# Patient Record
Sex: FEMALE | Race: WHITE | Employment: OTHER | URBAN - METROPOLITAN AREA
[De-identification: names, ages, dates, MRNs, and addresses within clinical notes are randomized per-mention and may not be internally consistent; named-entity substitution may affect disease eponyms.]

---

## 2021-08-26 ENCOUNTER — HOSPITAL ENCOUNTER (OUTPATIENT)
Dept: GENERAL RADIOLOGY | Age: 66
Discharge: HOME | End: 2021-08-26
Payer: MEDICARE

## 2021-08-26 DIAGNOSIS — M06.4 INFLAMMATORY POLYARTHRITIS (HCC): ICD-10-CM

## 2021-08-26 DIAGNOSIS — M15.9 GENERALIZED OSTEOARTHRITIS: ICD-10-CM

## 2021-08-26 DIAGNOSIS — R53.83 OTHER FATIGUE: ICD-10-CM

## 2021-08-26 DIAGNOSIS — E55.9 HYPOVITAMINOSIS D: ICD-10-CM

## 2021-08-26 DIAGNOSIS — Z79.899 LONG TERM CURRENT USE OF IMMUNOSUPPRESSIVE DRUG: ICD-10-CM

## 2021-08-26 DIAGNOSIS — Z11.1 TUBERCULOSIS SCREENING: ICD-10-CM

## 2021-08-26 DIAGNOSIS — Z79.1 LONG TERM (CURRENT) USE OF NON-STEROIDAL ANTI-INFLAMMATORIES (NSAID): ICD-10-CM

## 2021-08-26 DIAGNOSIS — Z79.52 LONG TERM (CURRENT) USE OF SYSTEMIC STEROIDS: ICD-10-CM

## 2021-08-26 DIAGNOSIS — M05.9 SEROPOSITIVE RHEUMATOID ARTHRITIS (HCC): ICD-10-CM

## 2021-08-26 DIAGNOSIS — R89.4 SEROLOGIC ABNORMALITY: ICD-10-CM

## 2021-08-26 DIAGNOSIS — R79.89 LFT ELEVATION: ICD-10-CM

## 2021-08-26 DIAGNOSIS — Z79.899 HIGH RISK MEDICATION USE: ICD-10-CM

## 2021-08-26 PROCEDURE — 73130 X-RAY EXAM OF HAND: CPT

## 2022-04-21 PROBLEM — E03.9 PRIMARY HYPOTHYROIDISM: Status: ACTIVE | Noted: 2022-04-21

## 2022-04-21 PROBLEM — K52.9 CHRONIC DIARRHEA: Status: ACTIVE | Noted: 2022-04-21

## 2022-05-23 ENCOUNTER — INFUSION (OUTPATIENT)
Dept: RHEUMATOLOGY | Age: 67
End: 2022-05-23
Payer: COMMERCIAL

## 2022-05-23 VITALS
HEART RATE: 84 BPM | TEMPERATURE: 97.3 F | SYSTOLIC BLOOD PRESSURE: 109 MMHG | WEIGHT: 139.8 LBS | BODY MASS INDEX: 27.3 KG/M2 | DIASTOLIC BLOOD PRESSURE: 67 MMHG

## 2022-05-23 DIAGNOSIS — M05.9 SEROPOSITIVE RHEUMATOID ARTHRITIS (HCC): Primary | ICD-10-CM

## 2022-05-23 PROCEDURE — 96413 CHEMO IV INFUSION 1 HR: CPT | Performed by: INTERNAL MEDICINE

## 2022-05-23 PROCEDURE — 96415 CHEMO IV INFUSION ADDL HR: CPT | Performed by: INTERNAL MEDICINE

## 2022-05-23 PROCEDURE — 96375 TX/PRO/DX INJ NEW DRUG ADDON: CPT | Performed by: INTERNAL MEDICINE

## 2022-05-23 RX ORDER — METHYLPREDNISOLONE SODIUM SUCCINATE 125 MG/2ML
100 INJECTION, POWDER, LYOPHILIZED, FOR SOLUTION INTRAMUSCULAR; INTRAVENOUS ONCE
Status: COMPLETED | OUTPATIENT
Start: 2022-05-23 | End: 2022-05-23

## 2022-05-23 RX ORDER — RITUXIMAB 10 MG/ML
375 INJECTION, SOLUTION INTRAVENOUS ONCE
Qty: 61.5 ML | Refills: 0 | Status: CANCELLED
Start: 2022-05-23 | End: 2022-05-23

## 2022-05-23 RX ADMIN — METHYLPREDNISOLONE SODIUM SUCCINATE 100 MG: 125 INJECTION, POWDER, LYOPHILIZED, FOR SOLUTION INTRAMUSCULAR; INTRAVENOUS at 09:35

## 2022-05-23 NOTE — PROGRESS NOTES
[id number] [address], [address]  Office : [phone], Fax: [phone]       Rituxan infusion today. Course # 2, treatment # 1. Patient's diagnosis is Seropositive Rheumatoid Arthritis. Rituxan 1000 mg infused in 250 ml NaCl. Patient monitored closely during infusion. Patient approved for free medication through [facility]. Premedicated with Tylenol 1000 mg PO, Antihistamine PO and Soul Medrol 100 mg given by IV push 30 minutes prior to the start of the Rituxan infusion. IV insertion time: 0930  Solu Medrol push given at 0935 over 2 minutes  Medication start time: 1005  Medication completion time: 1335    Pre-Infusion Questionnaire  1. Has your insurance changed or have you received a new card since your last visit? No  2. Have you had an infection since your last infusion? No  3. Have you recently had GI problems, open wounds, urinary problems, or chest pain? No  4. Are you currently taking antibiotics? 5. No  6. Have you recently had or are you scheduled for any surgical procedures? No  7. Have you had a TB test in the last year? Yes  8. Did you take an antihistamine today? Yes  9. Have you received any vaccinations recently? No  10. When was your last appointment with one of our providers? 4/27/22  11. When was your last infusion? 11/4/21  12. Are you in a skilled nursing facility?       No    Reviewed and Confirmed by Anthony Dickerson

## 2022-06-06 ENCOUNTER — INFUSION (OUTPATIENT)
Dept: RHEUMATOLOGY | Age: 67
End: 2022-06-06
Payer: COMMERCIAL

## 2022-06-06 VITALS — HEART RATE: 90 BPM | DIASTOLIC BLOOD PRESSURE: 74 MMHG | SYSTOLIC BLOOD PRESSURE: 134 MMHG | TEMPERATURE: 97.3 F

## 2022-06-06 DIAGNOSIS — M05.9 SEROPOSITIVE RHEUMATOID ARTHRITIS (HCC): Primary | ICD-10-CM

## 2022-06-06 PROCEDURE — 96375 TX/PRO/DX INJ NEW DRUG ADDON: CPT | Performed by: INTERNAL MEDICINE

## 2022-06-06 PROCEDURE — 96413 CHEMO IV INFUSION 1 HR: CPT | Performed by: INTERNAL MEDICINE

## 2022-06-06 RX ORDER — METHYLPREDNISOLONE SODIUM SUCCINATE 125 MG/2ML
100 INJECTION, POWDER, LYOPHILIZED, FOR SOLUTION INTRAMUSCULAR; INTRAVENOUS ONCE
Status: COMPLETED | OUTPATIENT
Start: 2022-06-06 | End: 2022-06-06

## 2022-06-06 RX ADMIN — METHYLPREDNISOLONE SODIUM SUCCINATE 100 MG: 125 INJECTION, POWDER, LYOPHILIZED, FOR SOLUTION INTRAMUSCULAR; INTRAVENOUS at 09:55

## 2022-06-06 NOTE — PROGRESS NOTES
[id number] [address], [address]  Office : [phone], Fax: [phone]       Rituxan infusion today. Course # 2, treatment # 2. Patient's diagnosis is rheumatoid arthritis . Rituxan 1000 mg infused in 250  ml NaCl. Patient monitored closely during infusion. Premedicated with Tylenol 1000 mg PO, Antihistamine PO and Soul Medrol 100  mg given by IV push 30 minutes prior to the start of the Rituxan infusion. Pt supplied medication. Lot # [id number] exp  12/2024    IV insertion time: 3627  Solu Medrol push given at 0955 over 2 minutes  Medication start time: 1025  Medication completion time: 1345    Pre-Infusion Questionnaire  1. Has your insurance changed or have you received a new card since your last visit? No  2. Have you had an infection since your last infusion?   no  3. Have you recently had GI problems, open wounds, urinary problems, or chest pain? No  4. Are you currently taking antibiotics? 5. No  6. Have you recently had or are you scheduled for any surgical procedures? No  7. Have you had a TB test in the last year? Yes  8. Did you take an antihistamine today? Yes  9. Have you received any vaccinations recently? Yes  10. When was your last appointment with one of our providers? 2/22/2022  11. When was your last infusion? 5/23/2022  12. Are you in a skilled nursing facility?       No    Reviewed and Confirmed by Aline Cottrell

## 2022-06-15 DIAGNOSIS — E03.9 PRIMARY HYPOTHYROIDISM: Primary | ICD-10-CM

## 2022-07-12 ENCOUNTER — TELEPHONE (OUTPATIENT)
Dept: RHEUMATOLOGY | Age: 67
End: 2022-07-12

## 2022-07-22 ENCOUNTER — OFFICE VISIT (OUTPATIENT)
Dept: ENDOCRINOLOGY | Age: 67
End: 2022-07-22
Payer: COMMERCIAL

## 2022-07-22 VITALS
SYSTOLIC BLOOD PRESSURE: 102 MMHG | DIASTOLIC BLOOD PRESSURE: 66 MMHG | OXYGEN SATURATION: 95 % | BODY MASS INDEX: 27.68 KG/M2 | HEIGHT: 60 IN | WEIGHT: 141 LBS | HEART RATE: 102 BPM

## 2022-07-22 DIAGNOSIS — E03.9 PRIMARY HYPOTHYROIDISM: Primary | ICD-10-CM

## 2022-07-22 PROCEDURE — 99213 OFFICE O/P EST LOW 20 MIN: CPT | Performed by: INTERNAL MEDICINE

## 2022-07-22 PROCEDURE — 1123F ACP DISCUSS/DSCN MKR DOCD: CPT | Performed by: INTERNAL MEDICINE

## 2022-07-22 RX ORDER — LEVOTHYROXINE SODIUM 88 UG/1
88 TABLET ORAL DAILY
Qty: 90 TABLET | Refills: 3 | Status: SHIPPED | OUTPATIENT
Start: 2022-07-22 | End: 2022-10-28 | Stop reason: SDUPTHER

## 2022-07-22 ASSESSMENT — ENCOUNTER SYMPTOMS
ROS SKIN COMMENTS: SHE REPORTS HAIR LOSS, DRY SKIN.
CONSTIPATION: 0
DIARRHEA: 1

## 2022-07-22 NOTE — PROGRESS NOTES
not want to treat her with other biologics. She has a history of fatty liver and her methotrexate was stopped. Skin:         She reports hair loss, dry skin. Neurological:  Negative for tremors. Vital Signs:  /66   Pulse (!) 102   Ht 5' (1.524 m)   Wt 141 lb (64 kg)   SpO2 95%   BMI 27.54 kg/m²     Wt Readings from Last 3 Encounters:   07/22/22 141 lb (64 kg)   05/23/22 139 lb 12.8 oz (63.4 kg)   04/27/22 138 lb (62.6 kg)       Physical Exam  Constitutional:       General: She is not in acute distress. Neck:      Thyroid: No thyroid mass or thyromegaly. Cardiovascular:      Rate and Rhythm: Normal rate and regular rhythm. Lymphadenopathy:      Cervical: No cervical adenopathy. Neurological:      Motor: No tremor. Orders Placed This Encounter   Procedures    TSH with Reflex     Standing Status:   Future     Standing Expiration Date:   7/22/2023         Current Outpatient Medications   Medication Sig Dispense Refill    ALPHA LIPOIC ACID PO Take 1,000 mg by mouth      Cyanocobalamin (VITAMIN B-12 PO) Take by mouth      levothyroxine (SYNTHROID) 88 MCG tablet Take 1 tablet by mouth in the morning.  90 tablet 3    amLODIPine-benazepril (LOTREL) 5-10 MG per capsule Take 1 capsule by mouth daily      Cholecalciferol 50 MCG (2000 UT) TABS Take by mouth daily      estradiol (ESTRACE) 0.5 MG tablet Take 0.5 mg by mouth every 48 hours      omeprazole (PRILOSEC) 40 MG delayed release capsule Take 40 mg by mouth every 48 hours      predniSONE (DELTASONE) 5 MG tablet 5mg daily as needed      sertraline (ZOLOFT) 50 MG tablet Take 50 mg by mouth daily       Current Facility-Administered Medications   Medication Dose Route Frequency Provider Last Rate Last Admin    riTUXimab (RITUXAN) chemo injection 1,000 mg  1,000 mg IntraVENous Once Clarence Conley MD

## 2022-08-25 ENCOUNTER — OFFICE VISIT (OUTPATIENT)
Dept: RHEUMATOLOGY | Age: 67
End: 2022-08-25
Payer: COMMERCIAL

## 2022-08-25 VITALS
BODY MASS INDEX: 27.15 KG/M2 | HEART RATE: 86 BPM | WEIGHT: 139 LBS | SYSTOLIC BLOOD PRESSURE: 142 MMHG | DIASTOLIC BLOOD PRESSURE: 86 MMHG

## 2022-08-25 DIAGNOSIS — Z79.899 HIGH RISK MEDICATION USE: ICD-10-CM

## 2022-08-25 DIAGNOSIS — Z71.85 VACCINE COUNSELING: ICD-10-CM

## 2022-08-25 DIAGNOSIS — Z79.52 LONG TERM (CURRENT) USE OF SYSTEMIC STEROIDS: ICD-10-CM

## 2022-08-25 DIAGNOSIS — M05.9 SEROPOSITIVE RHEUMATOID ARTHRITIS (HCC): Primary | ICD-10-CM

## 2022-08-25 DIAGNOSIS — R53.83 OTHER FATIGUE: ICD-10-CM

## 2022-08-25 DIAGNOSIS — M15.9 GENERALIZED OSTEOARTHRITIS: ICD-10-CM

## 2022-08-25 DIAGNOSIS — M25.511 ACUTE PAIN OF RIGHT SHOULDER: ICD-10-CM

## 2022-08-25 DIAGNOSIS — Z79.899 LONG TERM CURRENT USE OF IMMUNOSUPPRESSIVE DRUG: ICD-10-CM

## 2022-08-25 DIAGNOSIS — E55.9 HYPOVITAMINOSIS D: ICD-10-CM

## 2022-08-25 PROCEDURE — 1123F ACP DISCUSS/DSCN MKR DOCD: CPT | Performed by: INTERNAL MEDICINE

## 2022-08-25 PROCEDURE — 20610 DRAIN/INJ JOINT/BURSA W/O US: CPT | Performed by: INTERNAL MEDICINE

## 2022-08-25 PROCEDURE — 99215 OFFICE O/P EST HI 40 MIN: CPT | Performed by: INTERNAL MEDICINE

## 2022-08-25 RX ORDER — PREDNISONE 1 MG/1
TABLET ORAL
Qty: 90 TABLET | Refills: 0 | Status: SHIPPED | OUTPATIENT
Start: 2022-08-25

## 2022-08-25 RX ORDER — METHYLPREDNISOLONE ACETATE 80 MG/ML
80 INJECTION, SUSPENSION INTRA-ARTICULAR; INTRALESIONAL; INTRAMUSCULAR; SOFT TISSUE ONCE
Status: COMPLETED | OUTPATIENT
Start: 2022-08-25 | End: 2022-08-25

## 2022-08-25 RX ADMIN — METHYLPREDNISOLONE ACETATE 80 MG: 80 INJECTION, SUSPENSION INTRA-ARTICULAR; INTRALESIONAL; INTRAMUSCULAR; SOFT TISSUE at 13:25

## 2022-08-25 NOTE — PATIENT INSTRUCTIONS
1. Labs - cbc, cmp, vit d before next visit   2.   R shoulder Injected - try not to overuse for next couple of days  3. prednisone  5mg daily as needed   4. Vit d 2000 daily   5.   RTC in  3 months  with labs call if any issues   6 flu shot asap

## 2022-08-25 NOTE — PROGRESS NOTES
Subjective:      HPI:      Permanent history    Mrs. Zully Nur is a very pleasant 61-year-old  lady with past medical history significant for hypertension, hyperlipidemia, hypothyroidism, osteoporosis, osteoarthritis and rheumatoid arthritis who presents for evaluation. Was diagnosed approximately 20 years ago in [place], and initially her diagnosis was PMR as she started with hip problems and trouble walking. Was on prednisone for about a year and felt very well on it. Moved to [place] approximately 9 to 10 years ago. At that time his joints started getting worse and involving more the joint of the posterior muscles. Seen by outside rheumatologist, who did work-up and diagnosed RA. Was started on methotrexate and prednisone this was working well for a while. However patient felt she needed to wean off due to fatty liver, therefore slowly came off all her meds. Still having symptoms stiffness swelling, pain and redness in joints. However trying to take many over-the-counter herbal remedies if possible. At one time she may have restarted methotrexate, but discontinued shortly thereafter because of worsening liver issues. Since then has been maintained on long-term diclofenac. Occasionally takes this with ibuprofen also. Despite this symptoms significantly worsening in the past 18 months, now All her joint hurt. Pain is continuing to get worse. Dx with melanoma in neck, removed - doesn't go to onc - no treatments. Right neck symptoms are severe, she cant get down on her knees. Worse first in the morning with morning stiffness lasting up to 30 minutes. Generally improves with movement worse with prolonged inactivity. Family history significant for, 1 daughter- healthy. Mom with polychondritis,  young. Dad with cardio issues-passed away in 76s. 1 brother- had prostate cancer . Mom had thyroid issue. mamadou has thyroid issues.      Had covid 2021 - after vaccinated - mild symtooms. Some difficlty swalling pills. T Alcohol 3-4 drinks a day. fromer smoker- a few years total, quit over 20 years ago. Last bmd [id number] years ago- dx with osteopenia. otal hysterecotmy - everythig taken out - abbout 10 years ago. Since last visit  Pt is Vit D 2000 daily and Pred 5mg every day since 7/15/22 for pain. Worst pain Rt shoulder and hands. Pt has had swelling in hands and knees. Pt has Rituxin 5/23/22 and 6/6/22. Pt didn't feel any better after the Rituxin. She has tried tylenol arthritis and ibuprofen,helped some, felt better after starting Pred but took some time. Labs in Care. ROS:     Musculoskeletal ROS:   .    Abnormal:  joint pain,jont swelling,neck pain  . AM stiffness (hours): 10min  . Pain scale (0-10): 4  . Fatigue scale (0-10): 4  .    Job status: not working - retired  . Activities of daily living: some difficulty,    positive as above with the addition of   Otherwise negative for:  Fevers, chills or sweats. Weight  stable  No headaches or scalp tenderness. No jaw claudication. No acute visual changes. No red or dry eyes. No auditory complaints. No nasal discharge or rhinorrhea or dry mouth. No oral lesions or ulcers. No sore throat. No tongue pain. No cough. No shortness of breath, dyspnea on exertion or wheezing. No hemoptysis. No chest pains or palpitations. No lightheadedness. No pedal edema. No GERD symptoms, abdominal pain,diarrhea or constipation. No increased urinary frequency, nocturia, dysuria or changes in urine color. No alopecia, skin rashes/lesions. No changes in skin tightness. No Raynaud's. Photosensitivity. Current Outpatient Medications:     Alpha Lipoic Acid 600 mg cap, , Disp: , Rfl:     cyanocobalamin (Vitamin B-12) 1,000 mcg tablet, , Disp: , Rfl:     levothyroxine (SYNTHROID) 100 mcg tablet, Take 1 Tablet by mouth daily. , Disp: 90 Tablet, Rfl: 3    cholecalciferol, vitamin D3, (Vitamin D3) 50 mcg (2,000 unit) tab, Take  by mouth daily. , Disp: , Rfl:     amLODIPine-benazepril (LOTREL) 5-10 mg per capsule, Take 1 Capsule by mouth daily. , Disp: , Rfl:     estradioL (ESTRACE) 0.5 mg tablet, Take 0.5 mg by mouth every fourty-eight (48) hours. , Disp: , Rfl:     omeprazole (PRILOSEC) 40 mg capsule, Take 40 mg by mouth every fourty-eight (48) hours. , Disp: , Rfl:     predniSONE (DELTASONE) 5 mg tablet, prednisone 10 mg daily for 5 days then down to 7.5 mg daily x 5 then 5mg daily as needed, Disp: 100 Tablet, Rfl: 0    sertraline (ZOLOFT) 50 mg tablet, Take 50 mg by mouth daily. , Disp: , Rfl:     Allergies   Allergen Reactions    Fluoxetine Other (comments)     Welts on skin       Patient Active Problem List   Diagnosis Code    Primary hypothyroidism E03.9    Chronic diarrhea K52.9       Past Medical History:   Diagnosis Date    HTN (hypertension)     Hypothyroidism     Melanoma ([facility] [address]     OA (osteoarthritis)     OP (osteoporosis)     Rheumatoid arthritis ([facility] [address]        Past Surgical History:   Procedure Laterality Date    HX CARPAL TUNNEL RELEASE Right 2019    HX  SECTION      HX HYSTERECTOMY      HX OTHER SURGICAL  2022    greater toenail removal bilateral        Family History   Problem Relation Age of Onset    Other Mother         polychondritis     OSTEOARTHRITIS Mother     Thyroid Disease Mother     Heart Disease Father     Stroke Father     Other Father         kidney disease    Prostate Cancer Brother     Lung Cancer Paternal Grandfather     Thyroid Disease Daughter        Social History     Socioeconomic History    Marital status:    Tobacco Use    Smoking status: Former Smoker     Types: Cigarettes    Smokeless tobacco: Never Used   Substance and Sexual Activity    Alcohol use:  Yes     Alcohol/week: 3.0 standard drinks     Types: 3 Standard drinks or equivalent per week           Objective:      Vitals:    22 1058   BP: (!) 142/86   Pulse: 86   Weight: 139 lb (63 kg)         PHYSICAL EXAMINATION: General: alert, healthy, well nourished, pleasant, no acute distress   Lungs: clear to auscultation bilaterally without wheezes,    Heart: regular rate & rhythm, +S1, +S2, no murmurs   Extremities: no clubbing, cyanosis, or edema  Neuro: grossly non-focal,       MUSCULOSKELETAL:   -Hands: Fullness with tenderness to palpation throughout the MCPs and PIPs bilaterally R>L   -Wrists: Tenderness and fullness bilaterally  -Elbows: normal   -Shoulders: R shoudler TTP with decreased Rom on abduction   -Neck: Slow but full range of motion due to pain  -Back: Point tenderness of the low back  -Hips: normal   -Knees: Bilateral crepitus tenderness to palpation  -Ankles: Bilateral tenderness  -Feet: Fullness with tenderness to palpation bilaterally R>L     Swollen Joint Count:3 - more chronic thikcening   Tender Joint Count: 4         Procedure:   Patient identified, procedure verified, site verified. Risks/benefits discussed. Final verification of procedure. Timeout performed. Application of topical anesthetic and sterile preparation. injection site: right shoulderinjected with depomedrol 80 mg and 0.5ml of 1% lidocaine. Patient tolerated well, no complications. Assessment:       Mrs. Megan King is a very pleasant 64-year-old  lady with past medical history significant for hypertension, hyperlipidemia, hypothyroidism, osteoporosis, osteoarthritis, fatty liver disease, history of melanoma, and rheumatoid arthritis who presents for fu. Work-up shows longstanding rheumatoid arthritis previously treated with methotrexate but discontinued after fatty liver diagnosis,? History of Remicade patient denies however her old records show that may have used. Has been off all DMARDs except long-term NSAIDs for the past several years with worsening symptoms needs more aggressive regiment now.   labs normal CBC CMP shows mildly elevated LFTs, normal hepatitis panel, borderline TSH.   Positive CCP in the past -but now normal.  X-ray of the hands which I reviewed myself show periarticular osteopenia, mild joint space narrowing, mild erosive changes on the right MCPs consistent with rheumatoid arthritis. Clinical picture suggestive of seropositive rheumatoid arthritis, poorly controlled currently. Extensive comorbidities including history of melanoma, fatty liver disease therefore limited in our options. discussed options and changed to rituximab . First treatment completed nov 2021, most recent dose in June, was off of prednisone for several weeks restarted recently 5 mg a day as needed. Discussed next dose closer to when needing more prednisone or at least 4 months since last infusion. Right shoulder flaring more osteoarthritis, significant pain with abduction, was injected. Please note TSH is persistently fluctuant, thus is a contributing factor in her arthritis, r now follows with endocrine- reviewed records - TSH stable and ok to proceed with rituxan - labs stable. Osteoporosis screening, DEXA scan January 2022 shows FRAX calculation (using patients risk factors ) of 10 yr risk of major osteoporotic and hip fracture is 9.2% and 1% respectively and Tx warranted if >20% and 3 % respectively thus continue vitamin D supplementation. Treatment plan was discussed in detail with patient. Agreement and understanding of the plan was verbalized by the patient. Total visit time   45 minutes, greater than half of the time was spent on counseling. Plan:       1. Labs - cbc, cmp, vit d before next visit   2.   R shoulder Injected - try not to overuse for next couple of days  3. prednisone  5mg daily as needed   4. Vit d 2000 daily   5.   RTC in  3 months  with labs call if any issues   6 flu shot asap

## 2022-10-28 ENCOUNTER — OFFICE VISIT (OUTPATIENT)
Dept: ENDOCRINOLOGY | Age: 67
End: 2022-10-28
Payer: COMMERCIAL

## 2022-10-28 VITALS
WEIGHT: 140.6 LBS | OXYGEN SATURATION: 97 % | DIASTOLIC BLOOD PRESSURE: 88 MMHG | BODY MASS INDEX: 27.46 KG/M2 | HEART RATE: 100 BPM | SYSTOLIC BLOOD PRESSURE: 142 MMHG

## 2022-10-28 DIAGNOSIS — E03.9 PRIMARY HYPOTHYROIDISM: Primary | ICD-10-CM

## 2022-10-28 PROCEDURE — 1123F ACP DISCUSS/DSCN MKR DOCD: CPT | Performed by: INTERNAL MEDICINE

## 2022-10-28 PROCEDURE — 99213 OFFICE O/P EST LOW 20 MIN: CPT | Performed by: INTERNAL MEDICINE

## 2022-10-28 RX ORDER — LEVOTHYROXINE SODIUM 88 UG/1
88 TABLET ORAL DAILY
Qty: 90 TABLET | Refills: 3 | Status: SHIPPED | OUTPATIENT
Start: 2022-10-28

## 2022-10-28 ASSESSMENT — ENCOUNTER SYMPTOMS
ROS SKIN COMMENTS: SHE REPORTS HAIR LOSS, DRY SKIN.
DIARRHEA: 1
CONSTIPATION: 0

## 2022-10-28 NOTE — PROGRESS NOTES
Hipolito Pierce MD, [facility]  [address], [address] [facility], [address] [place] [address]  Phone [phone]  Facsimile [phone]          Che Manning is a 69 y.o. female seen 10/28/2022 for follow up of hypothyroidism        ASSESSMENT AND PLAN:    1. Primary hypothyroidism  She has a longstanding history of hypothyroidism. Based on her positive family history of thyroid disease and concurrent history of rheumatoid arthritis, I suspect she likely has Hashimoto's thyroiditis. She is clinically and biochemically euthyroid. She lives in an [address] [place] [address] Street and will be gone from 2/2023 until 8/2023. I will repeat her thyroid function tests in 1/2023 and prior to next visit. - levothyroxine (SYNTHROID) 88 MCG tablet; Take 1 tablet by mouth in the morning. Dispense: 90 tablet; Refill: 3      Follow-up and Dispositions    Return 8/2023. HISTORY OF PRESENT ILLNESS:    THYROID DISEASE     Presentation/Diagnosis: Hypothyroidism diagnosed in 1990 after the birth of her child. Symptoms: See review of systems. Treatment: Takes generic in AM correctly. Imaging: None. Labs:  3/13/2020: TSH 0.210.  9/16/2020: TSH 0.035, free T4 1.21.  2/25/2021: TSH 0.185, free T4 1.35.  1/7/2022: TSH 0.013, free T4 1.70.  2/21/2022: TSH 0.097, free T4 1.50.  4/20/2022: TSH 0.805, free T4 1.23, 25-OH vitamin D 45.9.  7/19/2022: TSH 0.239, free T4 1.55.  10/25/2022: TSH 0.643, free T4 1.49. Review of Systems   Constitutional:  Negative for diaphoresis, fatigue and unexpected weight change. Cardiovascular:  Negative for palpitations. Gastrointestinal:  Positive for diarrhea (significantly improved with probiotics). Negative for constipation. Endocrine: Negative for cold intolerance and heat intolerance. Musculoskeletal:         Her RA is only well controlled when she takes prednisone. She currently takes prednisone 5 mg daily.   She also receives Rituxan infusions every 6 months. She has a history of melanoma, so her rheumatologist does not want to treat her with other biologics. She has a history of fatty liver and her methotrexate was stopped. Skin:         She reports hair loss, dry skin. Neurological:  Negative for tremors. Vital Signs:  BP (!) 142/88   Pulse 100   Wt 140 lb 9.6 oz (63.8 kg)   SpO2 97%   BMI 27.46 kg/m²     Wt Readings from Last 3 Encounters:   10/28/22 140 lb 9.6 oz (63.8 kg)   08/25/22 139 lb (63 kg)   07/22/22 141 lb (64 kg)       Physical Exam  Constitutional:       General: She is not in acute distress. Neck:      Thyroid: No thyroid mass or thyromegaly. Cardiovascular:      Rate and Rhythm: Normal rate and regular rhythm. Lymphadenopathy:      Cervical: No cervical adenopathy. Neurological:      Motor: No tremor.          Orders Placed This Encounter   Procedures    TSH with Reflex     Standing Status:   Future     Standing Expiration Date:   10/28/2023    TSH with Reflex     Standing Status:   Future     Standing Expiration Date:   10/28/2023           Current Outpatient Medications   Medication Sig Dispense Refill    levothyroxine (SYNTHROID) 88 MCG tablet Take 1 tablet by mouth daily 90 tablet 3    predniSONE (DELTASONE) 5 MG tablet 5mg daily as needed 90 tablet 0    ALPHA LIPOIC ACID PO Take 1,000 mg by mouth      Cyanocobalamin (VITAMIN B-12 PO) Take by mouth      amLODIPine-benazepril (LOTREL) 5-10 MG per capsule Take 1 capsule by mouth daily      Cholecalciferol 50 MCG (2000 UT) TABS Take by mouth daily      estradiol (ESTRACE) 0.5 MG tablet Take 0.5 mg by mouth every 48 hours      omeprazole (PRILOSEC) 40 MG delayed release capsule Take 40 mg by mouth every 48 hours      sertraline (ZOLOFT) 50 MG tablet Take 50 mg by mouth daily       Current Facility-Administered Medications   Medication Dose Route Frequency Provider Last Rate Last Admin    riTUXimab (RITUXAN) chemo injection 1,000 mg  1,000 mg IntraVENous Once Kathy Paiz MD

## 2022-11-19 LAB
T4 FREE SERPL-MCNC: 1.37 NG/DL (ref 0.82–1.77)
TSH SERPL DL<=0.005 MIU/L-ACNC: 2.09 UIU/ML (ref 0.45–4.5)

## 2022-12-07 ENCOUNTER — PATIENT MESSAGE (OUTPATIENT)
Dept: ENDOCRINOLOGY | Age: 67
End: 2022-12-07

## 2022-12-07 DIAGNOSIS — E03.9 PRIMARY HYPOTHYROIDISM: Primary | ICD-10-CM

## 2022-12-07 NOTE — TELEPHONE ENCOUNTER
From: Margie Cook  To: Dr. Giordano Poor: 12/7/2022 8:31 AM EST  Subject: Test results     I made a mistake and took wrong lab order in, should have been rheumatology related test. Should I do another test at the end of January?

## 2022-12-10 LAB
25(OH)D3+25(OH)D2 SERPL-MCNC: 43.4 NG/ML (ref 30–100)
ALBUMIN SERPL-MCNC: 4.8 G/DL (ref 3.8–4.8)
ALBUMIN/GLOB SERPL: 2.2 {RATIO} (ref 1.2–2.2)
ALP SERPL-CCNC: 151 IU/L (ref 44–121)
ALT SERPL-CCNC: 42 IU/L (ref 0–32)
AST SERPL-CCNC: 48 IU/L (ref 0–40)
BASOPHILS # BLD AUTO: 0.1 X10E3/UL (ref 0–0.2)
BASOPHILS NFR BLD AUTO: 1 %
BILIRUB SERPL-MCNC: 0.6 MG/DL (ref 0–1.2)
BUN SERPL-MCNC: 15 MG/DL (ref 8–27)
BUN/CREAT SERPL: 18 (ref 12–28)
CALCIUM SERPL-MCNC: 9 MG/DL (ref 8.7–10.3)
CHLORIDE SERPL-SCNC: 107 MMOL/L (ref 96–106)
CO2 SERPL-SCNC: 21 MMOL/L (ref 20–29)
CREAT SERPL-MCNC: 0.84 MG/DL (ref 0.57–1)
EGFR: 75 ML/MIN/1.73
EOSINOPHIL # BLD AUTO: 0.1 X10E3/UL (ref 0–0.4)
EOSINOPHIL NFR BLD AUTO: 1 %
ERYTHROCYTE [DISTWIDTH] IN BLOOD BY AUTOMATED COUNT: 11.7 % (ref 11.7–15.4)
GLOBULIN SER CALC-MCNC: 2.2 G/DL (ref 1.5–4.5)
GLUCOSE SERPL-MCNC: 107 MG/DL (ref 70–99)
HCT VFR BLD AUTO: 41.5 % (ref 34–46.6)
HGB BLD-MCNC: 14.4 G/DL (ref 11.1–15.9)
IMM GRANULOCYTES # BLD AUTO: 0 X10E3/UL (ref 0–0.1)
IMM GRANULOCYTES NFR BLD AUTO: 0 %
LYMPHOCYTES # BLD AUTO: 2.4 X10E3/UL (ref 0.7–3.1)
LYMPHOCYTES NFR BLD AUTO: 25 %
MCH RBC QN AUTO: 33.7 PG (ref 26.6–33)
MCHC RBC AUTO-ENTMCNC: 34.7 G/DL (ref 31.5–35.7)
MCV RBC AUTO: 97 FL (ref 79–97)
MONOCYTES # BLD AUTO: 0.4 X10E3/UL (ref 0.1–0.9)
MONOCYTES NFR BLD AUTO: 4 %
NEUTROPHILS # BLD AUTO: 6.6 X10E3/UL (ref 1.4–7)
NEUTROPHILS NFR BLD AUTO: 69 %
PLATELET # BLD AUTO: 192 X10E3/UL (ref 150–450)
POTASSIUM SERPL-SCNC: 3.6 MMOL/L (ref 3.5–5.2)
PROT SERPL-MCNC: 7 G/DL (ref 6–8.5)
RBC # BLD AUTO: 4.27 X10E6/UL (ref 3.77–5.28)
SODIUM SERPL-SCNC: 145 MMOL/L (ref 134–144)
SPECIMEN STATUS REPORT: NORMAL
WBC # BLD AUTO: 9.7 X10E3/UL (ref 3.4–10.8)

## 2022-12-14 ENCOUNTER — TELEPHONE (OUTPATIENT)
Dept: RHEUMATOLOGY | Age: 67
End: 2022-12-14

## 2022-12-14 ENCOUNTER — TELEMEDICINE (OUTPATIENT)
Dept: RHEUMATOLOGY | Age: 67
End: 2022-12-14
Payer: MEDICARE

## 2022-12-14 DIAGNOSIS — M15.9 GENERALIZED OSTEOARTHRITIS: ICD-10-CM

## 2022-12-14 DIAGNOSIS — M25.511 ACUTE PAIN OF RIGHT SHOULDER: ICD-10-CM

## 2022-12-14 DIAGNOSIS — Z71.85 VACCINE COUNSELING: ICD-10-CM

## 2022-12-14 DIAGNOSIS — Z79.899 HIGH RISK MEDICATION USE: ICD-10-CM

## 2022-12-14 DIAGNOSIS — Z79.52 LONG TERM (CURRENT) USE OF SYSTEMIC STEROIDS: ICD-10-CM

## 2022-12-14 DIAGNOSIS — R53.83 OTHER FATIGUE: ICD-10-CM

## 2022-12-14 DIAGNOSIS — M05.9 SEROPOSITIVE RHEUMATOID ARTHRITIS (HCC): Primary | ICD-10-CM

## 2022-12-14 DIAGNOSIS — Z79.899 LONG TERM CURRENT USE OF IMMUNOSUPPRESSIVE DRUG: ICD-10-CM

## 2022-12-14 DIAGNOSIS — E55.9 HYPOVITAMINOSIS D: ICD-10-CM

## 2022-12-14 PROCEDURE — 1123F ACP DISCUSS/DSCN MKR DOCD: CPT | Performed by: INTERNAL MEDICINE

## 2022-12-14 PROCEDURE — 99215 OFFICE O/P EST HI 40 MIN: CPT | Performed by: INTERNAL MEDICINE

## 2022-12-14 RX ORDER — PREDNISONE 1 MG/1
TABLET ORAL
Qty: 180 TABLET | Refills: 0 | Status: SHIPPED | OUTPATIENT
Start: 2022-12-14

## 2022-12-14 RX ORDER — CLOBETASOL PROPIONATE 0.46 MG/ML
SOLUTION TOPICAL
COMMUNITY
Start: 2022-12-12

## 2022-12-14 NOTE — PROGRESS NOTES
Andrade Wilson is a 69 y.o. female who was seen by synchronous (real-time) audio-video technology. Consent:  She and/or her healthcare decision maker is aware that this patient-initiated Telehealth encounter is a billable service, with coverage as determined by her insurance carrier. She is aware that she may receive a bill and has provided verbal consent to proceed: Yes    I was at home while conducting this encounter. Subjective:      HPI:      Permanent history    Mrs. Hugo Posadas is a very pleasant 68-year-old  lady with past medical history significant for hypertension, hyperlipidemia, hypothyroidism, osteoporosis, osteoarthritis and rheumatoid arthritis who presents for evaluation. Was diagnosed approximately 20 years ago in [place], and initially her diagnosis was PMR as she started with hip problems and trouble walking. Was on prednisone for about a year and felt very well on it. Moved to [place] approximately 9 to 10 years ago. At that time his joints started getting worse and involving more the joint of the posterior muscles. Seen by outside rheumatologist, who did work-up and diagnosed RA. Was started on methotrexate and prednisone this was working well for a while. However patient felt she needed to wean off due to fatty liver, therefore slowly came off all her meds. Still having symptoms stiffness swelling, pain and redness in joints. However trying to take many over-the-counter herbal remedies if possible. At one time she may have restarted methotrexate, but discontinued shortly thereafter because of worsening liver issues. Since then has been maintained on long-term diclofenac. Occasionally takes this with ibuprofen also. Despite this symptoms significantly worsening in the past 18 months, now All her joint hurt. Pain is continuing to get worse. Dx with melanoma in neck, removed - doesn't go to onc - no treatments.   Right neck symptoms are severe, she pain,diarrhea or constipation. No increased urinary frequency, nocturia, dysuria or changes in urine color. No alopecia, skin rashes/lesions. No changes in skin tightness. No Raynaud's. Photosensitivity. Current Outpatient Medications:     Alpha Lipoic Acid 600 mg cap, , Disp: , Rfl:     cyanocobalamin (Vitamin B-12) 1,000 mcg tablet, , Disp: , Rfl:     levothyroxine (SYNTHROID) 100 mcg tablet, Take 1 Tablet by mouth daily. , Disp: 90 Tablet, Rfl: 3    cholecalciferol, vitamin D3, (Vitamin D3) 50 mcg (2,000 unit) tab, Take  by mouth daily. , Disp: , Rfl:     amLODIPine-benazepril (LOTREL) 5-10 mg per capsule, Take 1 Capsule by mouth daily. , Disp: , Rfl:     estradioL (ESTRACE) 0.5 mg tablet, Take 0.5 mg by mouth every fourty-eight (48) hours. , Disp: , Rfl:     omeprazole (PRILOSEC) 40 mg capsule, Take 40 mg by mouth every fourty-eight (48) hours. , Disp: , Rfl:     predniSONE (DELTASONE) 5 mg tablet, prednisone 10 mg daily for 5 days then down to 7.5 mg daily x 5 then 5mg daily as needed, Disp: 100 Tablet, Rfl: 0    sertraline (ZOLOFT) 50 mg tablet, Take 50 mg by mouth daily. , Disp: , Rfl:     Allergies   Allergen Reactions    Fluoxetine Other (comments)     Welts on skin       Patient Active Problem List   Diagnosis Code    Primary hypothyroidism E03.9    Chronic diarrhea K52.9       Past Medical History:   Diagnosis Date    HTN (hypertension)     Hypothyroidism     Melanoma ([facility] [address]     OA (osteoarthritis)     OP (osteoporosis)     Rheumatoid arthritis ([facility] [address]        Past Surgical History:   Procedure Laterality Date    HX CARPAL TUNNEL RELEASE Right 2019    HX  SECTION      HX HYSTERECTOMY      HX OTHER SURGICAL  2022    greater toenail removal bilateral        Family History   Problem Relation Age of Onset    Other Mother         polychondritis     OSTEOARTHRITIS Mother     Thyroid Disease Mother     Heart Disease Father     Stroke Father     Other Father         kidney disease    Prostate Cancer Brother     Lung Cancer Paternal Grandfather     Thyroid Disease Daughter        Social History     Socioeconomic History    Marital status:    Tobacco Use    Smoking status: Former Smoker     Types: Cigarettes    Smokeless tobacco: Never Used   Substance and Sexual Activity    Alcohol use: Yes     Alcohol/week: 3.0 standard drinks     Types: 3 Standard drinks or equivalent per week       Objective:          PHYSICAL EXAMINATION:     There were no vitals taken for this visit. General: alert, cooperative, no distress, Appears well-developed and well-nourished   HENT: Normocephalic, atraumatic   Mental  status: mental status: alert, oriented to person, place, and time, normal mood, behavior, speech, dress, motor activity, and thought processes   Resp: resp: normal effort and no respiratory distress   Neuro: neuro: no gross deficits - No Facial Asymmetry (Cranial nerve 7 motor function) (limited exam due to video visit)    Skin: skin: no discoloration or lesions of concern on visible areas   MSK; Normal gait with no signs of ataxia, Normal range of motion of neck     Due to this being a TeleHealth evaluation, many elements of the physical examination are unable to be assessed. Assessment:       Mrs. Akanksha Mckinley is a very pleasant 49-year-old  lady with past medical history significant for hypertension, hyperlipidemia, hypothyroidism, osteoporosis, osteoarthritis, fatty liver disease, history of melanoma, and rheumatoid arthritis who presents for fu. Work-up shows longstanding rheumatoid arthritis previously treated with methotrexate but discontinued after fatty liver diagnosis,? History of Remicade patient denies however her old records show that may have used. Has been off all DMARDs except long-term NSAIDs for the past several years with worsening symptoms needs more aggressive regiment now.   labs normal CBC CMP shows mildly elevated LFTs, normal hepatitis panel, borderline TSH. Positive CCP in the past -but now normal.  X-ray of the hands which I reviewed myself show periarticular osteopenia, mild joint space narrowing, mild erosive changes on the right MCPs consistent with rheumatoid arthritis. Clinical picture suggestive of seropositive rheumatoid arthritis, poorly controlled currently. Extensive comorbidities including history of melanoma, fatty liver disease therefore limited in our options. discussed options and changed to rituximab . First treatment completed nov 2021, most recent dose in June, was off of prednisone for several weeks restarted recently 5 mg a day as needed -now up to 10 mg prednisone the past few weeks especially after COVID, has been 5 and half months since her last dose will schedule soon as possible. We will also update TB QuantiFERON    Right shoulder flaring more osteoarthritis, significant pain with abduction, was injected -last visit, improved. Osteoporosis screening, DEXA scan January 2022 shows FRAX calculation (using patients risk factors ) of 10 yr risk of major osteoporotic and hip fracture is 9.2% and 1% respectively and Tx warranted if >20% and 3 % respectively thus continue vitamin D supplementation. Treatment plan was discussed in detail with patient. Agreement and understanding of the plan was verbalized by the patient. Total visit time   42 minutes, greater than half of the time was spent on counseling. Plan:       1. Labs -TB QuantiFERON at the next visit for infusion  2. Rituximab 1000 mg day 1 repeat day 15, IV for RA diagnosis needs to schedule soon as possible given end of the year  3. Prednisone 5 to 10 mg daily as needed  4. Vit d 2000 daily   5. RTC in  3 months  with labs call if any issues with CBC CMP and vitamin D           CPT Codes 47486-68360 for Established Patients may apply to this Telehealth Visit  Total visit time  42 minutes , greater than half of the time was spent on counseling. We discussed the expected course, resolution and complications of the diagnosis(es) in detail. Medication risks, benefits, costs, interactions, and alternatives were discussed as indicated. I advised her to contact the office if her condition worsens, changes or fails to improve as anticipated. She expressed understanding with the diagnosis(es) and plan. Pursuant to the emergency declaration under the [address] [place] [address] waiver authority and the [employer] General Act, this Virtual  Visit was conducted, with patient's consent, to reduce the patient's risk of exposure to COVID-19 and provide continuity of care for an established patient. Services were provided through a video synchronous discussion virtually to substitute for in-person clinic visit.     Leander Weinberg MD

## 2022-12-14 NOTE — TELEPHONE ENCOUNTER
----- Message from Margarito Hunter sent at 12/14/2022  2:07 PM EST -----  Regarding: Rituxan    Rituximab 1000 mg day 1 repeat day 15, IV for RA diagnosis needs to schedule soon as possible given end of the year    Per ZANDER [place] in place, schedule thurs or fri and draw out TB then an get next one in as well before end of year

## 2022-12-14 NOTE — PATIENT INSTRUCTIONS
1. Labs -TB QuantiFERON at the next visit for infusion  2. Rituximab 1000 mg day 1 repeat day 15, IV for RA diagnosis needs to schedule soon as possible given end of the year  3. Prednisone 5 to 10 mg daily as needed  4. Vit d 2000 daily   5.   RTC in  3 months  with labs call if any issues with CBC CMP and vitamin D

## 2022-12-14 NOTE — TELEPHONE ENCOUNTER
PHONE CALL to [facility] to request shipment of Rituxan. She does not have a current Rx on file for the patient. Spoke with Jeffrey [place], pharmacist and gave verbal Rx. Rituxan 1000 mg day 0, day 15, repeat every 4 months. PHONE CALL to patient to let her know I will call her once I have the delivery date for the Rituxan.

## 2022-12-16 NOTE — TELEPHONE ENCOUNTER
PHONE CALL to [facility] to order the Rituxan. Delivery set for 12/23/22. Order # [id number]. PHONE CALL to patient to schedule the infusion. LEFT MESSAGE on her VM.

## 2022-12-19 NOTE — TELEPHONE ENCOUNTER
Spoke with patient. Scheduled Rituxan for 12/21/22, second for 1/5/23. Will use stock and replace when hers arrives.  wanted the infusion to be done ASAP.

## 2022-12-21 ENCOUNTER — NURSE ONLY (OUTPATIENT)
Dept: RHEUMATOLOGY | Age: 67
End: 2022-12-21
Payer: MEDICARE

## 2022-12-21 VITALS
DIASTOLIC BLOOD PRESSURE: 92 MMHG | BODY MASS INDEX: 27.62 KG/M2 | WEIGHT: 141.4 LBS | TEMPERATURE: 97.3 F | HEART RATE: 70 BPM | SYSTOLIC BLOOD PRESSURE: 136 MMHG

## 2022-12-21 DIAGNOSIS — Z79.52 LONG TERM (CURRENT) USE OF SYSTEMIC STEROIDS: ICD-10-CM

## 2022-12-21 DIAGNOSIS — Z79.899 HIGH RISK MEDICATION USE: ICD-10-CM

## 2022-12-21 DIAGNOSIS — M15.9 GENERALIZED OSTEOARTHRITIS: ICD-10-CM

## 2022-12-21 DIAGNOSIS — R53.83 OTHER FATIGUE: ICD-10-CM

## 2022-12-21 DIAGNOSIS — Z79.899 LONG TERM CURRENT USE OF IMMUNOSUPPRESSIVE DRUG: ICD-10-CM

## 2022-12-21 DIAGNOSIS — M25.511 ACUTE PAIN OF RIGHT SHOULDER: ICD-10-CM

## 2022-12-21 DIAGNOSIS — M05.9 SEROPOSITIVE RHEUMATOID ARTHRITIS (HCC): ICD-10-CM

## 2022-12-21 DIAGNOSIS — Z71.85 VACCINE COUNSELING: ICD-10-CM

## 2022-12-21 DIAGNOSIS — E55.9 HYPOVITAMINOSIS D: ICD-10-CM

## 2022-12-21 PROCEDURE — 96413 CHEMO IV INFUSION 1 HR: CPT | Performed by: INTERNAL MEDICINE

## 2022-12-21 PROCEDURE — 96415 CHEMO IV INFUSION ADDL HR: CPT | Performed by: INTERNAL MEDICINE

## 2022-12-21 RX ORDER — METHYLPREDNISOLONE SODIUM SUCCINATE 125 MG/2ML
100 INJECTION, POWDER, LYOPHILIZED, FOR SOLUTION INTRAMUSCULAR; INTRAVENOUS ONCE
Status: COMPLETED | OUTPATIENT
Start: 2022-12-21 | End: 2022-12-21

## 2022-12-21 RX ADMIN — METHYLPREDNISOLONE SODIUM SUCCINATE 100 MG: 125 INJECTION, POWDER, LYOPHILIZED, FOR SOLUTION INTRAMUSCULAR; INTRAVENOUS at 09:45

## 2022-12-21 NOTE — PROGRESS NOTES
[address], [address], [address]   Office : [phone], Fax: [phone]       Rituxan infusion today. Course # 3, treatment # 1. Patient's diagnosis is Seropositive Rheumatoid Arthritis. Rituxan 1000 mg infused in 250 ml NaCl. Patient monitored closely during infusion. Premedicated with Tylenol 1000 mg PO, Antihistamine PO and Soul Medrol 100 mg given by IV push 30 minutes prior to the start of the Rituxan infusion. Patient medication supplied by [facility]. IV location: left forearm, Inserted by Lyric German RN  IV insertion time: 0485  Solu Medrol push given at 0945 over 2 minutes  Medication start time: 1015  Medication completion time: 1400    Patient discharged feeling fine and instructed to call the office with any post-infusion issues. Pre-Infusion Questionnaire  Has your insurance changed or have you received a new card since your last visit? No  Have you had an infection since your last infusion? No  Have you recently had GI problems, open wounds, urinary problems, or chest pain? No  Are you currently taking antibiotics? No  Have you recently had or are you scheduled for any surgical procedures? No  Have you had a TB test in the last year? Yes, 12/21/22 (pending)  Did you take an antihistamine today? Yes  Have you received any vaccinations recently? No  When was your last appointment with one of our providers? 12/14/22  When was your last infusion? 6/6/22  12. Are you in a skilled nursing facility?       No    Reviewed and Confirmed by Carlene Cueto

## 2023-01-05 ENCOUNTER — NURSE ONLY (OUTPATIENT)
Dept: RHEUMATOLOGY | Age: 68
End: 2023-01-05

## 2023-01-05 VITALS — TEMPERATURE: 97 F | DIASTOLIC BLOOD PRESSURE: 77 MMHG | HEART RATE: 70 BPM | SYSTOLIC BLOOD PRESSURE: 131 MMHG

## 2023-01-05 DIAGNOSIS — M05.9 SEROPOSITIVE RHEUMATOID ARTHRITIS (HCC): Primary | ICD-10-CM

## 2023-01-05 RX ORDER — METHYLPREDNISOLONE SODIUM SUCCINATE 125 MG/2ML
100 INJECTION, POWDER, LYOPHILIZED, FOR SOLUTION INTRAMUSCULAR; INTRAVENOUS ONCE
Status: COMPLETED | OUTPATIENT
Start: 2023-01-05 | End: 2023-01-05

## 2023-01-05 RX ADMIN — METHYLPREDNISOLONE SODIUM SUCCINATE 100 MG: 125 INJECTION, POWDER, LYOPHILIZED, FOR SOLUTION INTRAMUSCULAR; INTRAVENOUS at 09:40

## 2023-01-05 NOTE — PROGRESS NOTES
[address], [address], [address] [facility]  Office : [phone], Fax: [phone]       Rituxan infusion today. Course # 3, treatment # 2. Patient's diagnosis is rheumatoid arthritis. Rituxan 1000 mg infused in 250  ml NaCl. Patient monitored closely during infusion. Premedicated with Tylenol 1000 mg PO, Antihistamine PO and Soul Medrol 100  mg given by IV push 30 minutes prior to the start of the Rituxan infusion. IV location: right cephalic vein , Inserted by Kelly Ac RN  IV insertion time: 1857  Solu Medrol push given at  over 2 minutes  Medication start time: 1010  Medication completion time: 1330    Patient discharged feeling good and instructed to call the office with any post-infusion issues. Pre-Infusion Questionnaire  Has your insurance changed or have you received a new card since your last visit? No  Have you had an infection since your last infusion? No  Have you recently had GI problems, open wounds, urinary problems, or chest pain? No  Are you currently taking antibiotics? No  Have you recently had or are you scheduled for any surgical procedures? No  Have you had a TB test in the last year? Yes  Did you take an antihistamine today? Yes  Have you received any vaccinations recently? Yes  When was your last appointment with one of our providers? 12/14/2022  When was your last infusion? 12/21/2022  12. Are you in a skilled nursing facility?       No    Reviewed and Confirmed by Saint Sinner

## 2023-01-15 DIAGNOSIS — Z79.899 HIGH RISK MEDICATION USE: ICD-10-CM

## 2023-01-15 DIAGNOSIS — E55.9 HYPOVITAMINOSIS D: ICD-10-CM

## 2023-01-15 DIAGNOSIS — M25.511 ACUTE PAIN OF RIGHT SHOULDER: ICD-10-CM

## 2023-01-15 DIAGNOSIS — M15.9 GENERALIZED OSTEOARTHRITIS: ICD-10-CM

## 2023-01-15 DIAGNOSIS — Z79.52 LONG TERM (CURRENT) USE OF SYSTEMIC STEROIDS: ICD-10-CM

## 2023-01-15 DIAGNOSIS — R53.83 OTHER FATIGUE: ICD-10-CM

## 2023-01-15 DIAGNOSIS — Z71.85 VACCINE COUNSELING: ICD-10-CM

## 2023-01-15 DIAGNOSIS — Z79.899 LONG TERM CURRENT USE OF IMMUNOSUPPRESSIVE DRUG: ICD-10-CM

## 2023-01-15 DIAGNOSIS — M05.9 SEROPOSITIVE RHEUMATOID ARTHRITIS (HCC): ICD-10-CM

## 2023-01-15 RX ORDER — PREDNISONE 1 MG/1
TABLET ORAL
Qty: 180 TABLET | Refills: 0 | Status: SHIPPED | OUTPATIENT
Start: 2023-01-15

## 2023-01-18 ENCOUNTER — TELEPHONE (OUTPATIENT)
Dept: RHEUMATOLOGY | Age: 68
End: 2023-01-18

## 2023-01-18 NOTE — TELEPHONE ENCOUNTER
Pt lvm x2 with fax number for [facility] to send imaging orders. Corrected fax per pt is [phone]. Xmit orders via same as patient requested.

## 2023-01-18 NOTE — TELEPHONE ENCOUNTER
Rtc to [facility] radiology, [place]  [phone]. Orders received but she needs to do MRI clearance (does pt have metal, implants, etc). Requested she call and speak to patient to confirm this information.

## 2023-01-23 ENCOUNTER — APPOINTMENT (OUTPATIENT)
Dept: GENERAL RADIOLOGY | Age: 68
End: 2023-01-23
Payer: MEDICARE

## 2023-01-23 ENCOUNTER — HOSPITAL ENCOUNTER (EMERGENCY)
Age: 68
Discharge: HOME | End: 2023-01-23
Attending: EMERGENCY MEDICINE
Payer: MEDICARE

## 2023-01-23 ENCOUNTER — NURSE ONLY (OUTPATIENT)
Dept: RHEUMATOLOGY | Age: 68
End: 2023-01-23

## 2023-01-23 VITALS
OXYGEN SATURATION: 98 % | HEART RATE: 89 BPM | DIASTOLIC BLOOD PRESSURE: 77 MMHG | WEIGHT: 142 LBS | BODY MASS INDEX: 27.88 KG/M2 | RESPIRATION RATE: 16 BRPM | TEMPERATURE: 98.2 F | SYSTOLIC BLOOD PRESSURE: 116 MMHG | HEIGHT: 60 IN

## 2023-01-23 VITALS
WEIGHT: 146 LBS | HEIGHT: 60 IN | DIASTOLIC BLOOD PRESSURE: 76 MMHG | SYSTOLIC BLOOD PRESSURE: 128 MMHG | BODY MASS INDEX: 28.66 KG/M2 | HEART RATE: 84 BPM | RESPIRATION RATE: 14 BRPM

## 2023-01-23 DIAGNOSIS — M25.511 CHRONIC RIGHT SHOULDER PAIN: ICD-10-CM

## 2023-01-23 DIAGNOSIS — M05.9 SEROPOSITIVE RHEUMATOID ARTHRITIS (HCC): Primary | ICD-10-CM

## 2023-01-23 DIAGNOSIS — G89.29 CHRONIC RIGHT SHOULDER PAIN: ICD-10-CM

## 2023-01-23 DIAGNOSIS — M25.511 RIGHT SHOULDER PAIN, UNSPECIFIED CHRONICITY: Primary | ICD-10-CM

## 2023-01-23 PROCEDURE — 99283 EMERGENCY DEPT VISIT LOW MDM: CPT

## 2023-01-23 PROCEDURE — 73030 X-RAY EXAM OF SHOULDER: CPT

## 2023-01-23 ASSESSMENT — ROUTINE ASSESSMENT OF PATIENT INDEX DATA (RAPID3)
ON A SCALE OF ONE TO TEN, HOW DIFFICULT WAS IT FOR YOU TO COMPLETE THE LISTED DAILY PHYSICAL TASKS OVER THE LAST WEEK: 1.3
ON A SCALE OF ONE TO TEN, HOW MUCH OF A PROBLEM HAS UNUSUAL FATIGUE OR TIREDNESS BEEN FOR YOU OVER THE PAST WEEK?: 6
ON A SCALE OF ONE TO TEN, HOW MUCH PAIN HAVE YOU HAD BECAUSE OF YOUR CONDITION OVER THE PAST WEEK?: 9
WHEN YOU AWAKENED IN THE MORNING OVER THE LAST WEEK, PLEASE INDICATE THE AMOUNT OF TIME IT TAKES UNTIL YOU ARE AS LIMBER AS YOU WILL BE FOR THE DAY: > 1 HOUR
ON A SCALE OF ONE TO TEN, CONSIDERING ALL THE WAYS IN WHICH ILLNESS AND HEALTH CONDITIONS MAY AFFECT YOU AT THIS TIME, PLEASE INDICATE BELOW HOW YOU ARE DOING:: 9

## 2023-01-23 ASSESSMENT — PAIN SCALES - GENERAL: PAINLEVEL_OUTOF10: 9

## 2023-01-23 ASSESSMENT — PAIN DESCRIPTION - LOCATION: LOCATION: SHOULDER

## 2023-01-23 ASSESSMENT — PAIN DESCRIPTION - ORIENTATION: ORIENTATION: RIGHT

## 2023-01-23 ASSESSMENT — PAIN - FUNCTIONAL ASSESSMENT: PAIN_FUNCTIONAL_ASSESSMENT: 0-10

## 2023-01-23 ASSESSMENT — PAIN DESCRIPTION - DESCRIPTORS: DESCRIPTORS: SHARP;THROBBING

## 2023-01-23 NOTE — PROGRESS NOTES
1/23/23    `1/23/2023    SUBJECTIVE:  Alia Schwartz is a 69 y.o. female that is here for follow-up of:     Seropositive rheumatoid arthritis-last Rituxan completed end of December 2022, January 2023. Chronic R shoulder pain -received steroid injection in  Aug 2022 which provided relief for 6 weeks. Increasing symptoms since that time - constant pain 24 hours and very limited abduction. Pain increases with any activity or carrying any items in has minimal improvement with rest.  Has not completed physical therapy. Has not completed x-ray that was ordered by me last week. Has not scheduled MRI which was ordered by me last week. Leaving Feb 1 for [place] and wants to do PT there. No benefit with rtx infusion. Tried pred 20 mg x 7-10 days without benefit so stopped. Her daughter is a physical therapist and has investigated PT locations in [place] for her. She has this information for me today.       REVIEW OF SYSTEMS:  A total of 10 systems (musculoskeletal system as stated in the HPI and the following 9 systems) were reviewed with patient today and were negative except as stated in the HPI and except for the following (depicted with an \"X\"):        \"X\" Constitutional  \"X\" HEENT /Mouth  \"X\" Cardiovascular and  Respiratory (2 systems)  \"X\" Gastrointestinal    Fever/chills   Hair loss   Shortness of breath   Upset stomach    Falls   Dry mouth   Coughing   Diarrhea / constipation    Wt loss   Mouth sores   Wheezing   Heartburn    Wt gain   Ringing ears   Chest pain   Dark or bloody stools    Night sweats   Diff. swallowing  X None of above   Nausea or vomiting   X None of above  X None of above     X None of above                \"X\" Integumentary  \"X\" Neurological  \"X\" Genitourinary  \"X\" Psychiatric    Easy bruising   Numbness/ tingling   Female problems   Depression    Rashes   Weakness   Problems with urination   Feeling anxious    Sun sensitivity   Headaches  X None of above   Problems sleeping   X None of above  X None of above     X None of above       The following portions of the patient's history were reviewed and updated as appropriate:     Current Outpatient Medications:     clobetasol (TEMOVATE) 0.05 % external solution, , Disp: , Rfl:     levothyroxine (SYNTHROID) 88 MCG tablet, Take 1 tablet by mouth daily, Disp: 90 tablet, Rfl: 3    ALPHA LIPOIC ACID PO, Take 1,000 mg by mouth, Disp: , Rfl:     Cyanocobalamin (VITAMIN B-12 PO), Take by mouth, Disp: , Rfl:     amLODIPine-benazepril (LOTREL) 5-10 MG per capsule, Take 1 capsule by mouth daily, Disp: , Rfl:     Cholecalciferol 50 MCG (2000 UT) TABS, Take by mouth daily, Disp: , Rfl:     estradiol (ESTRACE) 0.5 MG tablet, Take 0.5 mg by mouth every 48 hours, Disp: , Rfl:     omeprazole (PRILOSEC) 40 MG delayed release capsule, Take 40 mg by mouth every 48 hours, Disp: , Rfl:     sertraline (ZOLOFT) 50 MG tablet, Take 50 mg by mouth daily, Disp: , Rfl:     predniSONE (DELTASONE) 5 MG tablet, 5mg- 10mg  daily as needed (Patient not taking: Reported on 1/23/2023), Disp: 180 tablet, Rfl: 0    zinc 50 MG CAPS, Take by mouth daily (Patient not taking: Reported on 1/23/2023), Disp: , Rfl:         Allergies   Allergen Reactions    Fluoxetine Other (See Comments)     Welts on skin           PHYSICAL EXAM:  /76   Pulse 84   Resp 14   Ht 5' (1.524 m)   Wt 146 lb (66.2 kg)   BMI 28.51 kg/m²   CONSTITUTIONAL:  The patient was in NAD.  ENT:  The OPC, MMM, lips/teeth/gums without abnormalities. NECK:  No masses or thyromegaly  LYMPH NODES:  No cervical or axillary lymphadenopathy. CV:  RRR no r/m/g. Normal peripheral pulses  RESP:  CTA bilaterally. Normal respiratory effort. GI:  Abdomen soft, non tender, no hepatosplenomegaly  Skin:  No rashes, nodules, or other lesions.     MUSCULOSKELETAL :  All joints including neck, back bilateral shoulders, elbows, wrists, MCP's, PIP's, DIP's, hips, knees, ankles, toes are within normal limits including normal gross examination, no synovitis, no tenderness, n'l ROM EXCEPT:   Right shoulder: Reduced passive range of motion abduction, active abduction to 30 degrees    ASSESSMENT AND PLAN:  Guanakito Varela is a 69 y.o. female that is here for evaluation of rheumatoid arthritis. her problems include:    Idalia Rodriguez was seen today for injections. Diagnoses and all orders for this visit:    Seropositive rheumatoid arthritis ([address]  Due for Rituxan in July 2023. Patient might be traveling during this time and is hoping to complete the infusion in August 2023. Chronic right shoulder pain  After informed consent was obtained and after a time out was taken. under sterile technique (utilizing sterile gloves) the right shoulder was injected with 40 mg of depo-medrol and 1cc of 1% lidocaine. A 25-gauge 1.5 inch needle was advanced using a posterolateral approach. 0 cc of clear fluid was aspirated and medication was injected through the same needle. Ethyl chloride spray was used for local anesthetic. ER warnings given & post-care instructions given. The procedure was well tolerated. -Patient to complete x-ray right shoulder today  - Patient to schedule MRI shoulder  - PT order placed today    Follow-up with Dr. Jayson Barnes as planned      Patricia Luna MD  [address]  [address] [place] [address], [address]  [phone]

## 2023-01-23 NOTE — ED PROVIDER NOTES
Emergency Department Provider Note                   PCP:                Bishop Rachele MD, MD               Age: 69 y.o. Sex: female       ICD-10-CM    1. Right shoulder pain, unspecified chronicity  [address]          DISPOSITION Decision To Discharge 01/23/2023 10:16:33 AM       Medical Decision Making  Amount and/or Complexity of Data Reviewed  Radiology: ordered. Complexity of Problem: 1 undiagnosed new problem with uncertain prognosis. (4)      I have reviewed records from an external source: ED records from outside this hospital.  Considerations: Shared decision making was utilized in the care of this patient. Patient was discharged risks and benefits of hospitalization were discussed. Orders Placed This Encounter   Procedures    XR SHOULDER RIGHT (MIN 2 VIEWS)    Faisal Arellanoy  [employer] Arm Sling, Right; M        Medications - No data to display    New Prescriptions    No medications on file        Nasrin Santiago is a 69 y.o. female who presents to the Emergency Department with chief complaint of    Chief Complaint   Patient presents with    Shoulder Pain      HPI  Well-appearing 78-year-old female presents to the emergency department with complaint of right shoulder pain constant x2 months. States that the pain is always there and is largely unchanged since onset. Pain is increased with abduction of the right shoulder, which is limited. States she is in the emergency department today due to no new or worsening symptoms, but states that she saw her rheumatologist in office today, had steroid injection to the right shoulder and states that the rheumatologist advised her to come to the ED to get a x-ray of the right shoulder upon leaving her rheumatology appointment this morning. This is confirmed reading rheumatologist note in EMR. Patient states that she has had prior injection to the shoulder, steroid course. Rheumatologist note shows that she was previously advised to have PT and MRI which has not yet been obtained. Patient denies chest pain, back pain, neck pain, difficulty breathing, cough, night sweats, weight loss, hemoptysis, fevers or chills, puncture wound, fall, known injury or other complaint. No activity change. History of RA, OA, osteoporosis, melanoma, hypertension, hypothyroidism. Is pleasant very well-appearing and appears no acute distress. Review of Systems  Constitutional: As in HPI  HENT: Negative     Eyes: Negative   Respiratory: Negative  Cardiovascular: Negative  GI/: As in HPI  Musculoskeletal: AS in HPI  Skin: Negative     Allergic/Immunologic: Negative  Neurological: Negative                        Past Medical History:   Diagnosis Date    HTN (hypertension)     Hypothyroidism     Melanoma ([address]     OA (osteoarthritis)     OP (osteoporosis)     Rheumatoid arthritis ([facility]         Past Surgical History:   Procedure Laterality Date    CARPAL TUNNEL RELEASE Right 2019     SECTION      HYSTERECTOMY (CERVIX STATUS UNKNOWN)      OTHER SURGICAL HISTORY  2022    greater toenail removal bilateral         Family History   Problem Relation Age of Onset    Prostate Cancer Brother     Lung Cancer Paternal Grandfather     Thyroid Disease Daughter     Other Father         kidney disease    Heart Disease Father     Thyroid Disease Mother     Osteoarthritis Mother     Other Mother         polychondritis     Stroke Father         Social History     Socioeconomic History    Marital status:    Tobacco Use    Smoking status: Former    Smokeless tobacco: Never   Substance and Sexual Activity    Alcohol use:  Yes     Alcohol/week: 3.0 standard drinks    Drug use: Not Currently        Allergies: Fluoxetine    Previous Medications    ALPHA LIPOIC ACID PO    Take 1,000 mg by mouth    AMLODIPINE-BENAZEPRIL (LOTREL) 5-10 MG PER CAPSULE    Take 1 capsule by mouth daily    CHOLECALCIFEROL 50 MCG (2000 UT) TABS    Take by mouth daily    CLOBETASOL (TEMOVATE) 0.05 % EXTERNAL SOLUTION        CYANOCOBALAMIN (VITAMIN B-12 PO)    Take by mouth    ESTRADIOL (ESTRACE) 0.5 MG TABLET    Take 0.5 mg by mouth every 48 hours    LEVOTHYROXINE (SYNTHROID) 88 MCG TABLET    Take 1 tablet by mouth daily    OMEPRAZOLE (PRILOSEC) 40 MG DELAYED RELEASE CAPSULE    Take 40 mg by mouth every 48 hours    PREDNISONE (DELTASONE) 5 MG TABLET    5mg- 10mg  daily as needed    SERTRALINE (ZOLOFT) 50 MG TABLET    Take 50 mg by mouth daily    ZINC 50 MG CAPS    Take by mouth daily        Vitals signs and nursing note reviewed. Patient Vitals for the past 4 hrs:   Temp Pulse Resp BP SpO2   01/23/23 0943 98.2 °F (36.8 °C) 89 16 116/77 98 %          Physical Exam   Constitutional: Oriented to person, place, and time. Appears well-developed and well-nourished. No distress. HENT:    Head: Normocephalic and atraumatic   Right Ear: External ear normal.    Left Ear: External ear normal.     Nose: Nose normal.   Mouth/Throat: Mouth normal.    Eyes: Conjunctivae are normal.   Neck: Supple. No tracheal deviation. Cardiovascular: Normal rate, intact distal pulses. Brisk capillary refill intact, less than 2 seconds. Regular rhythm present. No pitting edema. Pulmonary/Chest: Lungs are clear & equal bilaterally.-Sounds, no pain with inspiration, respiration or palpation. No adventitious sounds. No respiratory distress. Abdominal: Soft. There is no tenderness. Musculoskeletal: No obvious deformity, erythema, edema. No point tenderness of the right shoulder. There is no wound, lesion, swelling, erythema, palsy. Abduction is limited. No tenderness or normal finding of the right upper extremity, soft, no swelling, no pitting edema, skin normal color temperature. Intact distal pulses, cap refill less than 2 seconds.   No pallor, no pain on proportion, no midline tenderness of the neck or back, no chest wall or scapular tenderness. Neurological: Alert and oriented to person, place, and time. No numbness/tingling. No loss of sensation. Positive PMS ×4. GCS= 15. Skin: Skin is warm and dry. Capillary refill takes less than 2 seconds. No abrasion, no lesion, no petechiae and no rash noted. Not diaphoretic. No cyanosis, erythema, or pallor. Psychiatric: Normal mood and affect. Behavior is normal.    Nursing note and vitals reviewed. Procedures  Is this patient to be included in the SEP-1 core measure due to severe sepsis or septic shock? No Exclusion criteria - the patient is NOT to be included for SEP-1 Core Measure due to: Infection is not suspected     Results for orders placed or performed during the hospital encounter of 01/23/23   XR SHOULDER RIGHT (MIN 2 VIEWS)    Narrative    EXAMINATION: XR SHOULDER RIGHT (MIN 2 VIEWS) 1/23/2023 9:55 AM    ACCESSION NUMBER: [id number]    COMPARISON: None available    INDICATION: Pain    TECHNIQUE: 3 views of the right shoulder were obtained. FINDINGS:   No acute fracture or dislocation. Mild to moderate glenohumeral and AC joint  arthrosis. Bony mineralization is preserved. The soft tissues are unremarkable. Impression    1. No acute osseous abnormality. 2.  Mild-to-moderate glenohumeral and AC joint arthrosis. XR SHOULDER RIGHT (MIN 2 VIEWS)   Final Result   1. No acute osseous abnormality. 2.  Mild-to-moderate glenohumeral and AC joint arthrosis. Voice dictation software was used during the making of this note. This software is not perfect and grammatical and other typographical errors may be present. This note has not been completely proofread for errors.      Gilberto Alvarado, LIEN - CNP  01/23/23 4330

## 2023-01-23 NOTE — ED NOTES
I have reviewed discharge instructions with the patient. The patient verbalized understanding. Patient left ED via Discharge Method: ambulatory to Home with wself. Opportunity for questions and clarification provided. Patient given 0 scripts. To continue your aftercare when you leave the hospital, you may receive an automated call from our care team to check in on how you are doing. This is a free service and part of our promise to provide the best care and service to meet your aftercare needs.  If you have questions, or wish to unsubscribe from this service please call [phone]. Thank you for Choosing our [facility] Emergency Department.         Nasra Alcantara RN  01/23/23 1357

## 2023-01-23 NOTE — ED TRIAGE NOTES
Pt c/o right shoulder pain that started September. Pt denies fall or injury. Pt states she has been seeing rheumatology for her shoulder. Pt states she had a steroid shot in her right shoulder today.

## 2023-01-25 RX ORDER — METHYLPREDNISOLONE ACETATE 40 MG/ML
40 INJECTION, SUSPENSION INTRA-ARTICULAR; INTRALESIONAL; INTRAMUSCULAR; SOFT TISSUE ONCE
Status: SHIPPED | OUTPATIENT
Start: 2023-01-23

## 2023-01-27 LAB — TSH SERPL DL<=0.005 MIU/L-ACNC: 1.14 UIU/ML (ref 0.45–4.5)

## 2023-01-30 ENCOUNTER — TELEPHONE (OUTPATIENT)
Dept: RHEUMATOLOGY | Age: 68
End: 2023-01-30

## 2023-01-30 NOTE — TELEPHONE ENCOUNTER
Received vm from [employer] patient access. Patient was scheduled for MRI tomorrow but they have canceled this appt due to request for P2P from insurance. Scan is being declined.      Contact number for P2P is [phone]

## 2023-02-01 ENCOUNTER — TELEPHONE (OUTPATIENT)
Dept: RHEUMATOLOGY | Age: 68
End: 2023-02-01

## 2023-02-01 NOTE — TELEPHONE ENCOUNTER
Spoke with pt insurance to set up peer to peer. Reviewed patient hx and notes with peer to peer  Lainey VILLA. Due to documentation in patients chart and h/o oral and injected steroid use w no relief the MRI is being approved without peer to peer.       Approved 2/1/23-7/31/23 and is not a guarantee of payment    [id number]    Lainey VILLA intake interviewer for approval of MRI

## 2023-04-04 ENCOUNTER — TELEPHONE (OUTPATIENT)
Dept: RHEUMATOLOGY | Age: 68
End: 2023-04-04

## 2023-04-04 NOTE — TELEPHONE ENCOUNTER
Dx RA, Rituxan 12/21 and 1/5    Per pt,  Just wanted to update you on my last few months. Had my last infusion on January 5th with mild improvement overall except for my right shoulder. Followed up with Dr Kathie Neely for another cortisone shot. Still no relief. Had X-rays and MRI done which showed rotator cuff tears. Scheduled for surgery with Dr Josh Garcia on May 8th. Started with physical therapy. Was out of town for 3 weeks and came down with Covid again on March 8th. Went back on Paxlovid. Tested negative several times following. Still not feeling well. Came down with fever again but no positive Covid test. PCP said secondary bacterial infection. Took omnicef  and started back on prednisone 20 mg. Finally feeling better! I understand that the Paxlovid can have interaction with several of my medications such as Amlodipine, lauren thyroxine and Rotuxin. Could this have to do with only mild relief after last infusion? Please respond if necessary otherwise will see you at my June 8th appointment  (marcin and all)!   Thanks,   Joann Patel

## 2023-06-03 LAB
25(OH)D3+25(OH)D2 SERPL-MCNC: 52.4 NG/ML (ref 30–100)
ALBUMIN SERPL-MCNC: 4.8 G/DL (ref 3.8–4.8)
ALBUMIN/GLOB SERPL: 2 {RATIO} (ref 1.2–2.2)
ALP SERPL-CCNC: 148 IU/L (ref 44–121)
ALT SERPL-CCNC: 30 IU/L (ref 0–32)
AST SERPL-CCNC: 38 IU/L (ref 0–40)
BASOPHILS # BLD AUTO: 0.1 X10E3/UL (ref 0–0.2)
BASOPHILS NFR BLD AUTO: 1 %
BILIRUB SERPL-MCNC: 0.8 MG/DL (ref 0–1.2)
BUN SERPL-MCNC: 19 MG/DL (ref 8–27)
BUN/CREAT SERPL: 22 (ref 12–28)
CALCIUM SERPL-MCNC: 9.6 MG/DL (ref 8.7–10.3)
CHLORIDE SERPL-SCNC: 101 MMOL/L (ref 96–106)
CO2 SERPL-SCNC: 20 MMOL/L (ref 20–29)
CREAT SERPL-MCNC: 0.86 MG/DL (ref 0.57–1)
EGFRCR SERPLBLD CKD-EPI 2021: 73 ML/MIN/1.73
EOSINOPHIL # BLD AUTO: 0.1 X10E3/UL (ref 0–0.4)
EOSINOPHIL NFR BLD AUTO: 1 %
ERYTHROCYTE [DISTWIDTH] IN BLOOD BY AUTOMATED COUNT: 12.1 % (ref 11.7–15.4)
GLOBULIN SER CALC-MCNC: 2.4 G/DL (ref 1.5–4.5)
GLUCOSE SERPL-MCNC: 104 MG/DL (ref 70–99)
HCT VFR BLD AUTO: 39 % (ref 34–46.6)
HGB BLD-MCNC: 14 G/DL (ref 11.1–15.9)
IMM GRANULOCYTES # BLD AUTO: 0.1 X10E3/UL (ref 0–0.1)
IMM GRANULOCYTES NFR BLD AUTO: 1 %
LYMPHOCYTES # BLD AUTO: 2.8 X10E3/UL (ref 0.7–3.1)
LYMPHOCYTES NFR BLD AUTO: 30 %
MCH RBC QN AUTO: 33.6 PG (ref 26.6–33)
MCHC RBC AUTO-ENTMCNC: 35.9 G/DL (ref 31.5–35.7)
MCV RBC AUTO: 94 FL (ref 79–97)
MONOCYTES # BLD AUTO: 0.6 X10E3/UL (ref 0.1–0.9)
MONOCYTES NFR BLD AUTO: 7 %
NEUTROPHILS # BLD AUTO: 5.7 X10E3/UL (ref 1.4–7)
NEUTROPHILS NFR BLD AUTO: 60 %
PLATELET # BLD AUTO: 267 X10E3/UL (ref 150–450)
POTASSIUM SERPL-SCNC: 3.8 MMOL/L (ref 3.5–5.2)
PROT SERPL-MCNC: 7.2 G/DL (ref 6–8.5)
RBC # BLD AUTO: 4.17 X10E6/UL (ref 3.77–5.28)
SODIUM SERPL-SCNC: 140 MMOL/L (ref 134–144)
WBC # BLD AUTO: 9.4 X10E3/UL (ref 3.4–10.8)

## 2023-06-07 ENCOUNTER — TELEPHONE (OUTPATIENT)
Dept: RHEUMATOLOGY | Age: 68
End: 2023-06-07

## 2023-06-20 NOTE — TELEPHONE ENCOUNTER
Patient receives her Rituxan through [address] [place] [address] free of charge. PHONE CALL to [employer] to order her medication to be shipped to the office. Information could not be found. Will need to call Jenelle Rubio. PHONE CALL to Jenelle Rubio to check the status of her assistance. They have her as active and approved. Transferred me to [employer]. Did not . Will try again tomorrow.

## 2023-06-22 NOTE — TELEPHONE ENCOUNTER
PHONE CALL to [facility] to set up delivery of Rituxan. Patient [place]. [place] [address]. Patient would have to call to update her address with [facility]. I was not able to update. Will have pt call when in office. Active and refills are available. Rituxan to be delivered 6/29/23. PHONE CALL to patient to schedule her infusion. Scheduled for 6/27/23. Will use office stock and replace. Reminded pt of premeds- Tylenol and antihistamine.

## 2023-06-27 ENCOUNTER — NURSE ONLY (OUTPATIENT)
Dept: RHEUMATOLOGY | Age: 68
End: 2023-06-27
Payer: MEDICARE

## 2023-06-27 VITALS — SYSTOLIC BLOOD PRESSURE: 108 MMHG | DIASTOLIC BLOOD PRESSURE: 61 MMHG | HEART RATE: 84 BPM | TEMPERATURE: 98.1 F

## 2023-06-27 DIAGNOSIS — M05.9 SEROPOSITIVE RHEUMATOID ARTHRITIS (HCC): Primary | ICD-10-CM

## 2023-06-27 PROCEDURE — 96413 CHEMO IV INFUSION 1 HR: CPT | Performed by: INTERNAL MEDICINE

## 2023-06-27 PROCEDURE — 96415 CHEMO IV INFUSION ADDL HR: CPT | Performed by: INTERNAL MEDICINE

## 2023-06-27 PROCEDURE — 96375 TX/PRO/DX INJ NEW DRUG ADDON: CPT | Performed by: INTERNAL MEDICINE

## 2023-06-27 PROCEDURE — 96374 THER/PROPH/DIAG INJ IV PUSH: CPT | Performed by: INTERNAL MEDICINE

## 2023-06-27 RX ORDER — METHYLPREDNISOLONE SODIUM SUCCINATE 125 MG/2ML
100 INJECTION, POWDER, LYOPHILIZED, FOR SOLUTION INTRAMUSCULAR; INTRAVENOUS ONCE
Status: COMPLETED | OUTPATIENT
Start: 2023-06-27 | End: 2023-06-27

## 2023-06-27 RX ADMIN — METHYLPREDNISOLONE SODIUM SUCCINATE 100 MG: 125 INJECTION, POWDER, LYOPHILIZED, FOR SOLUTION INTRAMUSCULAR; INTRAVENOUS at 15:58

## 2023-07-12 ENCOUNTER — NURSE ONLY (OUTPATIENT)
Dept: RHEUMATOLOGY | Age: 68
End: 2023-07-12
Payer: MEDICARE

## 2023-07-12 VITALS — SYSTOLIC BLOOD PRESSURE: 138 MMHG | HEART RATE: 81 BPM | DIASTOLIC BLOOD PRESSURE: 84 MMHG | TEMPERATURE: 97.7 F

## 2023-07-12 DIAGNOSIS — M05.9 SEROPOSITIVE RHEUMATOID ARTHRITIS (HCC): Primary | ICD-10-CM

## 2023-07-12 PROCEDURE — 96415 CHEMO IV INFUSION ADDL HR: CPT | Performed by: INTERNAL MEDICINE

## 2023-07-12 PROCEDURE — 96375 TX/PRO/DX INJ NEW DRUG ADDON: CPT | Performed by: INTERNAL MEDICINE

## 2023-07-12 PROCEDURE — 96413 CHEMO IV INFUSION 1 HR: CPT | Performed by: INTERNAL MEDICINE

## 2023-07-12 PROCEDURE — 96374 THER/PROPH/DIAG INJ IV PUSH: CPT | Performed by: INTERNAL MEDICINE

## 2023-07-12 RX ORDER — METHYLPREDNISOLONE SODIUM SUCCINATE 125 MG/2ML
100 INJECTION, POWDER, LYOPHILIZED, FOR SOLUTION INTRAMUSCULAR; INTRAVENOUS ONCE
Status: COMPLETED | OUTPATIENT
Start: 2023-07-12 | End: 2023-07-12

## 2023-07-12 RX ADMIN — METHYLPREDNISOLONE SODIUM SUCCINATE 100 MG: 125 INJECTION, POWDER, LYOPHILIZED, FOR SOLUTION INTRAMUSCULAR; INTRAVENOUS at 11:20

## 2023-07-12 NOTE — PROGRESS NOTES
[place], [address] [place], [address]   Office : [phone], Fax: [phone]       Rituxan infusion today. Course # 4 , treatment # 2. Patient's diagnosis is Rheumatoid Arthriti . Rituxan 1 mg infused in 250  ml NaCl. Patient monitored closely during infusion. Premedicated with Tylenol 1000 mg PO, Antihistamine PO and Soul Medrol 100  mg given by IV push 30 minutes prior to the start of the Rituxan infusion. Pt supplied  by [facility]      IV location: left cephalic vein , Inserted by Sindhu Garcia RN  IV insertion time: 1120   Solu Medrol push given at 1120  over 2 minutes  Medication start time: 1145   Medication completion time: 1500     Patient discharged feeling good  and instructed to call the office with any post-infusion issues. Pre-Infusion Questionnaire  Has your insurance changed or have you received a new card since your last visit?  no  Have you had an infection since your last infusion? no  Since your last visit, have you been hospitalized, been to the ER, or Urgent [address] [place] [address] for any reason?  no  Have you recently had GI problems, open wounds, urinary problems, or chest pain? no  Are you currently taking antibiotics?   no  Have you recently had or are you scheduled for any surgical procedures?   no  Have you had a TB test in the last year?   yes,   Did you take an antihistamine today?  yes, Benadryl 25 mg PO  Have you received any vaccinations recently?  no  When was your last appointment with Dr. Jorge Shannon, Dr. Marilia Fofana, or Dr. Mancel Kocher or Dr. Becki Chen. VV 12/14/2022   When was your last infusion? 6/27/2023   12. Are you in a skilled nursing facility?       no    Reviewed and Confirmed by Ana Eaton

## 2023-08-08 LAB — TSH SERPL DL<=0.005 MIU/L-ACNC: 1.03 UIU/ML (ref 0.45–4.5)

## 2023-08-15 ENCOUNTER — OFFICE VISIT (OUTPATIENT)
Dept: ENDOCRINOLOGY | Age: 68
End: 2023-08-15
Payer: MEDICARE

## 2023-08-15 VITALS
WEIGHT: 143 LBS | OXYGEN SATURATION: 96 % | SYSTOLIC BLOOD PRESSURE: 116 MMHG | HEART RATE: 86 BPM | BODY MASS INDEX: 27.93 KG/M2 | DIASTOLIC BLOOD PRESSURE: 60 MMHG

## 2023-08-15 DIAGNOSIS — E03.9 PRIMARY HYPOTHYROIDISM: Primary | ICD-10-CM

## 2023-08-15 PROCEDURE — 1123F ACP DISCUSS/DSCN MKR DOCD: CPT | Performed by: INTERNAL MEDICINE

## 2023-08-15 PROCEDURE — 99213 OFFICE O/P EST LOW 20 MIN: CPT | Performed by: INTERNAL MEDICINE

## 2023-08-15 RX ORDER — LEVOTHYROXINE SODIUM 88 UG/1
88 TABLET ORAL DAILY
Qty: 90 TABLET | Refills: 3 | Status: SHIPPED | OUTPATIENT
Start: 2023-08-15

## 2023-08-15 ASSESSMENT — ENCOUNTER SYMPTOMS
ROS SKIN COMMENTS: SHE REPORTS HAIR LOSS, DRY SKIN.
CONSTIPATION: 0
DIARRHEA: 1

## 2023-08-26 LAB
25(OH)D3+25(OH)D2 SERPL-MCNC: 48.7 NG/ML (ref 30–100)
ALBUMIN SERPL-MCNC: 4.6 G/DL (ref 3.9–4.9)
ALBUMIN/GLOB SERPL: 2 {RATIO} (ref 1.2–2.2)
ALP SERPL-CCNC: 139 IU/L (ref 44–121)
ALT SERPL-CCNC: 25 IU/L (ref 0–32)
AST SERPL-CCNC: 32 IU/L (ref 0–40)
BASOPHILS # BLD AUTO: 0 X10E3/UL (ref 0–0.2)
BASOPHILS NFR BLD AUTO: 0 %
BILIRUB SERPL-MCNC: 0.9 MG/DL (ref 0–1.2)
BUN SERPL-MCNC: 14 MG/DL (ref 8–27)
BUN/CREAT SERPL: 16 (ref 12–28)
CALCIUM SERPL-MCNC: 9.4 MG/DL (ref 8.7–10.3)
CHLORIDE SERPL-SCNC: 103 MMOL/L (ref 96–106)
CO2 SERPL-SCNC: 21 MMOL/L (ref 20–29)
CREAT SERPL-MCNC: 0.86 MG/DL (ref 0.57–1)
EGFRCR SERPLBLD CKD-EPI 2021: 73 ML/MIN/1.73
EOSINOPHIL # BLD AUTO: 0.2 X10E3/UL (ref 0–0.4)
EOSINOPHIL NFR BLD AUTO: 2 %
ERYTHROCYTE [DISTWIDTH] IN BLOOD BY AUTOMATED COUNT: 12.2 % (ref 11.7–15.4)
GLOBULIN SER CALC-MCNC: 2.3 G/DL (ref 1.5–4.5)
GLUCOSE SERPL-MCNC: 108 MG/DL (ref 70–99)
HCT VFR BLD AUTO: 40.1 % (ref 34–46.6)
HGB BLD-MCNC: 13.8 G/DL (ref 11.1–15.9)
IMM GRANULOCYTES # BLD AUTO: 0.1 X10E3/UL (ref 0–0.1)
IMM GRANULOCYTES NFR BLD AUTO: 1 %
LYMPHOCYTES # BLD AUTO: 1.8 X10E3/UL (ref 0.7–3.1)
LYMPHOCYTES NFR BLD AUTO: 19 %
MCH RBC QN AUTO: 33.5 PG (ref 26.6–33)
MCHC RBC AUTO-ENTMCNC: 34.4 G/DL (ref 31.5–35.7)
MCV RBC AUTO: 97 FL (ref 79–97)
MONOCYTES # BLD AUTO: 0.6 X10E3/UL (ref 0.1–0.9)
MONOCYTES NFR BLD AUTO: 6 %
NEUTROPHILS # BLD AUTO: 7 X10E3/UL (ref 1.4–7)
NEUTROPHILS NFR BLD AUTO: 72 %
PLATELET # BLD AUTO: 203 X10E3/UL (ref 150–450)
POTASSIUM SERPL-SCNC: 4.3 MMOL/L (ref 3.5–5.2)
PROT SERPL-MCNC: 6.9 G/DL (ref 6–8.5)
RBC # BLD AUTO: 4.12 X10E6/UL (ref 3.77–5.28)
SODIUM SERPL-SCNC: 142 MMOL/L (ref 134–144)
WBC # BLD AUTO: 9.8 X10E3/UL (ref 3.4–10.8)

## 2023-08-31 ENCOUNTER — TELEMEDICINE (OUTPATIENT)
Dept: RHEUMATOLOGY | Age: 68
End: 2023-08-31
Payer: MEDICARE

## 2023-08-31 DIAGNOSIS — E55.9 HYPOVITAMINOSIS D: ICD-10-CM

## 2023-08-31 DIAGNOSIS — Z79.899 LONG TERM CURRENT USE OF IMMUNOSUPPRESSIVE DRUG: ICD-10-CM

## 2023-08-31 DIAGNOSIS — M54.16 RADICULOPATHY, LUMBAR REGION: ICD-10-CM

## 2023-08-31 DIAGNOSIS — Z71.85 VACCINE COUNSELING: ICD-10-CM

## 2023-08-31 DIAGNOSIS — Z79.52 LONG TERM (CURRENT) USE OF SYSTEMIC STEROIDS: ICD-10-CM

## 2023-08-31 DIAGNOSIS — G89.29 CHRONIC RIGHT SHOULDER PAIN: ICD-10-CM

## 2023-08-31 DIAGNOSIS — Z79.899 HIGH RISK MEDICATION USE: ICD-10-CM

## 2023-08-31 DIAGNOSIS — M25.511 CHRONIC RIGHT SHOULDER PAIN: ICD-10-CM

## 2023-08-31 DIAGNOSIS — R53.83 OTHER FATIGUE: ICD-10-CM

## 2023-08-31 DIAGNOSIS — M05.9 SEROPOSITIVE RHEUMATOID ARTHRITIS (HCC): Primary | ICD-10-CM

## 2023-08-31 DIAGNOSIS — M15.9 GENERALIZED OSTEOARTHRITIS: ICD-10-CM

## 2023-08-31 DIAGNOSIS — M25.511 ACUTE PAIN OF RIGHT SHOULDER: ICD-10-CM

## 2023-08-31 PROCEDURE — 1123F ACP DISCUSS/DSCN MKR DOCD: CPT | Performed by: INTERNAL MEDICINE

## 2023-08-31 PROCEDURE — 99215 OFFICE O/P EST HI 40 MIN: CPT | Performed by: INTERNAL MEDICINE

## 2023-08-31 RX ORDER — PREDNISONE 5 MG/1
TABLET ORAL
Qty: 90 TABLET | Refills: 1 | Status: SHIPPED | OUTPATIENT
Start: 2023-08-31

## 2023-08-31 RX ORDER — GABAPENTIN 300 MG/1
CAPSULE ORAL
Qty: 180 CAPSULE | Refills: 1 | Status: SHIPPED | OUTPATIENT
Start: 2023-08-31 | End: 2024-08-22

## 2023-08-31 NOTE — PROGRESS NOTES
Established Patients may apply to this Telehealth Visit  Total visit time 45 minutes , greater than half of the time was spent on counseling. We discussed the expected course, resolution and complications of the diagnosis(es) in detail. Medication risks, benefits, costs, interactions, and alternatives were discussed as indicated. I advised her to contact the office if her condition worsens, changes or fails to improve as anticipated. She expressed understanding with the diagnosis(es) and plan. Pursuant to the emergency declaration under the [place] [address] waiver authority and the [employer] Act, this Virtual  Visit was conducted, with patient's consent, to reduce the patient's risk of exposure to COVID-19 and provide continuity of care for an established patient. Services were provided through a video synchronous discussion virtually to substitute for in-person clinic visit.     Yamile Loera MD

## 2023-08-31 NOTE — PATIENT INSTRUCTIONS
1. Labs -CBC, CMP, vitamin D  2. Next rituximab dosing after December 12  3. Prednisone 5 once daily as needed  4. Start Tylenol scheduled at 1000 mg every morning and can take another dose 6 hours later as needed  5. Start gabapentin 300 mg once daily at 7 PM after we can increase to 600 mg daily at 7 PM  6. Flu shot due now  7. Vit d 2000 daily  8.   Return to clinic in 3-4 months with labs

## 2023-12-01 ENCOUNTER — TELEPHONE (OUTPATIENT)
Dept: RHEUMATOLOGY | Age: 68
End: 2023-12-01

## 2023-12-01 NOTE — TELEPHONE ENCOUNTER
Pt lvm that she wants to have labs for thyroid added on to labs she will be doing next Friday 12/8/23 for her next OV. She said this is a concern \"based on her interactions with her infusions\". Ok to add thyroid?

## 2023-12-04 DIAGNOSIS — R79.89 OTHER SPECIFIED ABNORMAL FINDINGS OF BLOOD CHEMISTRY: ICD-10-CM

## 2023-12-04 DIAGNOSIS — Z13.820 ENCOUNTER FOR SCREENING FOR OSTEOPOROSIS: ICD-10-CM

## 2023-12-04 DIAGNOSIS — E55.9 HYPOVITAMINOSIS D: ICD-10-CM

## 2023-12-04 DIAGNOSIS — M05.9 SEROPOSITIVE RHEUMATOID ARTHRITIS (HCC): ICD-10-CM

## 2023-12-04 DIAGNOSIS — R53.83 OTHER FATIGUE: Primary | ICD-10-CM

## 2023-12-09 LAB
25(OH)D3+25(OH)D2 SERPL-MCNC: 37.8 NG/ML (ref 30–100)
ALBUMIN SERPL-MCNC: 4.5 G/DL (ref 3.9–4.9)
ALBUMIN/GLOB SERPL: 2 {RATIO} (ref 1.2–2.2)
ALP SERPL-CCNC: 163 IU/L (ref 44–121)
ALT SERPL-CCNC: 38 IU/L (ref 0–32)
AST SERPL-CCNC: 46 IU/L (ref 0–40)
BASOPHILS # BLD AUTO: 0 X10E3/UL (ref 0–0.2)
BASOPHILS NFR BLD AUTO: 0 %
BILIRUB SERPL-MCNC: 0.7 MG/DL (ref 0–1.2)
BUN SERPL-MCNC: 14 MG/DL (ref 8–27)
BUN/CREAT SERPL: 15 (ref 12–28)
CALCIUM SERPL-MCNC: 8.9 MG/DL (ref 8.7–10.3)
CHLORIDE SERPL-SCNC: 106 MMOL/L (ref 96–106)
CO2 SERPL-SCNC: 18 MMOL/L (ref 20–29)
CREAT SERPL-MCNC: 0.93 MG/DL (ref 0.57–1)
EGFRCR SERPLBLD CKD-EPI 2021: 66 ML/MIN/1.73
EOSINOPHIL # BLD AUTO: 0.1 X10E3/UL (ref 0–0.4)
EOSINOPHIL NFR BLD AUTO: 1 %
ERYTHROCYTE [DISTWIDTH] IN BLOOD BY AUTOMATED COUNT: 12.3 % (ref 11.7–15.4)
GLOBULIN SER CALC-MCNC: 2.2 G/DL (ref 1.5–4.5)
GLUCOSE SERPL-MCNC: 81 MG/DL (ref 70–99)
HCT VFR BLD AUTO: 37.2 % (ref 34–46.6)
HGB BLD-MCNC: 13.2 G/DL (ref 11.1–15.9)
IMM GRANULOCYTES # BLD AUTO: 0 X10E3/UL (ref 0–0.1)
IMM GRANULOCYTES NFR BLD AUTO: 1 %
LYMPHOCYTES # BLD AUTO: 1.8 X10E3/UL (ref 0.7–3.1)
LYMPHOCYTES NFR BLD AUTO: 24 %
MCH RBC QN AUTO: 34.7 PG (ref 26.6–33)
MCHC RBC AUTO-ENTMCNC: 35.5 G/DL (ref 31.5–35.7)
MCV RBC AUTO: 98 FL (ref 79–97)
MONOCYTES # BLD AUTO: 0.5 X10E3/UL (ref 0.1–0.9)
MONOCYTES NFR BLD AUTO: 6 %
NEUTROPHILS # BLD AUTO: 5.2 X10E3/UL (ref 1.4–7)
NEUTROPHILS NFR BLD AUTO: 68 %
PLATELET # BLD AUTO: 159 X10E3/UL (ref 150–450)
POTASSIUM SERPL-SCNC: 3.8 MMOL/L (ref 3.5–5.2)
PROT SERPL-MCNC: 6.7 G/DL (ref 6–8.5)
RBC # BLD AUTO: 3.8 X10E6/UL (ref 3.77–5.28)
SODIUM SERPL-SCNC: 143 MMOL/L (ref 134–144)
SPECIMEN STATUS REPORT: NORMAL
WBC # BLD AUTO: 7.6 X10E3/UL (ref 3.4–10.8)

## 2023-12-13 ENCOUNTER — NURSE ONLY (OUTPATIENT)
Dept: RHEUMATOLOGY | Age: 68
End: 2023-12-13

## 2023-12-13 ENCOUNTER — OFFICE VISIT (OUTPATIENT)
Dept: RHEUMATOLOGY | Age: 68
End: 2023-12-13
Payer: MEDICARE

## 2023-12-13 VITALS
WEIGHT: 145 LBS | HEIGHT: 60 IN | SYSTOLIC BLOOD PRESSURE: 136 MMHG | HEART RATE: 81 BPM | DIASTOLIC BLOOD PRESSURE: 90 MMHG | BODY MASS INDEX: 28.47 KG/M2

## 2023-12-13 VITALS
DIASTOLIC BLOOD PRESSURE: 75 MMHG | HEART RATE: 81 BPM | WEIGHT: 145.5 LBS | SYSTOLIC BLOOD PRESSURE: 127 MMHG | TEMPERATURE: 97 F | BODY MASS INDEX: 28.42 KG/M2

## 2023-12-13 DIAGNOSIS — Z71.85 VACCINE COUNSELING: ICD-10-CM

## 2023-12-13 DIAGNOSIS — R53.83 OTHER FATIGUE: ICD-10-CM

## 2023-12-13 DIAGNOSIS — E55.9 HYPOVITAMINOSIS D: ICD-10-CM

## 2023-12-13 DIAGNOSIS — M05.9 SEROPOSITIVE RHEUMATOID ARTHRITIS (HCC): Primary | ICD-10-CM

## 2023-12-13 DIAGNOSIS — Z79.899 HIGH RISK MEDICATION USE: ICD-10-CM

## 2023-12-13 DIAGNOSIS — Z79.52 LONG TERM (CURRENT) USE OF SYSTEMIC STEROIDS: ICD-10-CM

## 2023-12-13 DIAGNOSIS — M54.16 RADICULOPATHY, LUMBAR REGION: ICD-10-CM

## 2023-12-13 DIAGNOSIS — M15.9 GENERALIZED OSTEOARTHRITIS: ICD-10-CM

## 2023-12-13 DIAGNOSIS — Z79.899 LONG TERM CURRENT USE OF IMMUNOSUPPRESSIVE DRUG: ICD-10-CM

## 2023-12-13 DIAGNOSIS — M25.511 ACUTE PAIN OF RIGHT SHOULDER: ICD-10-CM

## 2023-12-13 PROCEDURE — 1123F ACP DISCUSS/DSCN MKR DOCD: CPT | Performed by: INTERNAL MEDICINE

## 2023-12-13 PROCEDURE — 99215 OFFICE O/P EST HI 40 MIN: CPT | Performed by: INTERNAL MEDICINE

## 2023-12-13 RX ORDER — PREDNISONE 5 MG/1
TABLET ORAL
Qty: 90 TABLET | Refills: 1 | Status: CANCELLED | OUTPATIENT
Start: 2023-12-13

## 2023-12-13 RX ORDER — METHYLPREDNISOLONE SODIUM SUCCINATE 125 MG/2ML
100 INJECTION, POWDER, LYOPHILIZED, FOR SOLUTION INTRAMUSCULAR; INTRAVENOUS ONCE
Status: COMPLETED | OUTPATIENT
Start: 2023-12-13 | End: 2023-12-13

## 2023-12-13 RX ORDER — PREDNISONE 5 MG/1
TABLET ORAL
Qty: 180 TABLET | Refills: 0 | Status: CANCELLED | OUTPATIENT
Start: 2023-12-13

## 2023-12-13 RX ORDER — GABAPENTIN 300 MG/1
CAPSULE ORAL
Qty: 180 CAPSULE | Refills: 1 | Status: CANCELLED | OUTPATIENT
Start: 2023-12-13 | End: 2024-12-04

## 2023-12-13 RX ADMIN — METHYLPREDNISOLONE SODIUM SUCCINATE 100 MG: 125 INJECTION, POWDER, LYOPHILIZED, FOR SOLUTION INTRAMUSCULAR; INTRAVENOUS at 10:15

## 2023-12-13 NOTE — PROGRESS NOTES
Subjective:      HPI:      Permanent history    Mrs. Pascual Cunningham is a very pleasant 68-year-old  lady with past medical history significant for hypertension, hyperlipidemia, hypothyroidism, osteoporosis, osteoarthritis and rheumatoid arthritis who presents for evaluation. Was diagnosed approximately 20 years ago in [place], and initially her diagnosis was PMR as she started with hip problems and trouble walking. Was on prednisone for about a year and felt very well on it. Moved to [place] approximately 9 to 10 years ago. At that time his joints started getting worse and involving more the joint of the posterior muscles. Seen by outside rheumatologist, who did work-up and diagnosed RA. Was started on methotrexate and prednisone this was working well for a while. However patient felt she needed to wean off due to fatty liver, therefore slowly came off all her meds. Still having symptoms stiffness swelling, pain and redness in joints. However trying to take many over-the-counter herbal remedies if possible. At one time she may have restarted methotrexate, but discontinued shortly thereafter because of worsening liver issues. Since then has been maintained on long-term diclofenac. Occasionally takes this with ibuprofen also. Despite this symptoms significantly worsening in the past 18 months, now All her joint hurt. Pain is continuing to get worse. Dx with melanoma in neck, removed - doesn't go to onc - no treatments. Right neck symptoms are severe, she cant get down on her knees. Worse first in the morning with morning stiffness lasting up to 30 minutes. Generally improves with movement worse with prolonged inactivity. Family history significant for, 1 daughter- healthy. Mom with polychondritis,  young. Dad with cardio issues-passed away in 76s. 1 brother- had prostate cancer . Mom had thyroid issue. mamadou has thyroid issues.      Had covid 2021 - after vaccinated - mild

## 2023-12-13 NOTE — PATIENT INSTRUCTIONS
1. Labs -CBC, CMP, vitamin D  2. Can proceed with today's dose of rituximab 1000 mg IV today repeat day 15  3. Prednisone 5 once daily as needed  4. Continue Tylenol scheduled at 1000 mg every morning and can take another dose 6 hours later as needed  5. Continue gabapentin 6 to 900 mg once daily at 7 PM as needed  6. Vit d 2000 daily  7.   Return to clinic in 3-4 months with labs

## 2024-01-03 ENCOUNTER — NURSE ONLY (OUTPATIENT)
Dept: RHEUMATOLOGY | Age: 69
End: 2024-01-03
Payer: MEDICARE

## 2024-01-03 VITALS — HEART RATE: 80 BPM | SYSTOLIC BLOOD PRESSURE: 154 MMHG | DIASTOLIC BLOOD PRESSURE: 90 MMHG | TEMPERATURE: 97.9 F

## 2024-01-03 DIAGNOSIS — M05.9 SEROPOSITIVE RHEUMATOID ARTHRITIS (HCC): Primary | ICD-10-CM

## 2024-01-03 PROCEDURE — 96413 CHEMO IV INFUSION 1 HR: CPT | Performed by: INTERNAL MEDICINE

## 2024-01-03 PROCEDURE — 96415 CHEMO IV INFUSION ADDL HR: CPT | Performed by: INTERNAL MEDICINE

## 2024-01-03 PROCEDURE — 96374 THER/PROPH/DIAG INJ IV PUSH: CPT | Performed by: INTERNAL MEDICINE

## 2024-01-03 PROCEDURE — 96375 TX/PRO/DX INJ NEW DRUG ADDON: CPT | Performed by: INTERNAL MEDICINE

## 2024-01-03 RX ORDER — METHYLPREDNISOLONE SODIUM SUCCINATE 125 MG/2ML
100 INJECTION, POWDER, LYOPHILIZED, FOR SOLUTION INTRAMUSCULAR; INTRAVENOUS ONCE
Status: COMPLETED | OUTPATIENT
Start: 2024-01-03 | End: 2024-01-03

## 2024-01-03 RX ADMIN — METHYLPREDNISOLONE SODIUM SUCCINATE 100 MG: 125 INJECTION, POWDER, LYOPHILIZED, FOR SOLUTION INTRAMUSCULAR; INTRAVENOUS at 11:50

## 2024-01-03 NOTE — PROGRESS NOTES
[facility] RHEUMATOLOGY  [address] [place] [address], [address]  [place] [address]  Office : [phone], Fax: [phone]       Rituxan infusion today. Course # 5  , treatment #  2 . Patient's diagnosis is Rheumatoid Arthritis .   Rituxan 1000  mg infused in 250  ml NaCl. Patient monitored closely during infusion.   Premedicated with Tylenol 1000 mg PO, Antihistamine PO and Solu Medrol 100  mg given by IV push 30 minutes prior to the start of the Rituxan infusion.   Pt supplied by [facility]   IV location: left hand , Inserted by Shavonne Ruffin RN  IV insertion time: 1150   Solu Medrol push given at 1150  over 2 minutes  Medication start time: 1215   Medication completion time: 1535    Patient discharged feeling good  and instructed to call the office with any post-infusion issues.     Pre-Infusion Questionnaire  Has your insurance changed or have you received a new card since your last visit?  no  Have you had an infection since your last infusion?   no  Since your last visit, have you been hospitalized, been to the ER, or Urgent Care Center for any reason?  no  Have you recently had GI problems, open wounds, urinary problems, or chest pain?   no  Are you currently taking antibiotics?   no  Have you recently had or are you scheduled for any surgical procedures?   no  Have you had a TB test in the last year?   yes, 12/22/2023   Did you take an antihistamine today?  yes,   Have you received any vaccinations recently?  yes,   When was your last appointment with Dr. Boudreaux, Dr. Ly, or Dr. Castro or Dr. Zhang.   12/13/2023   When was your last infusion?        12/13/2023   12. Are you in a skilled nursing facility?      no    Reviewed and Confirmed by Che Moore

## 2024-02-28 NOTE — PROGRESS NOTES
[facility], [address] [place] [address], [address] [place] [address]  Office : [phone], Fax: [phone]       Rituxan infusion today. Course # 5, treatment # 1. Patient's diagnosis is Seropositive Rheumatoid Arthritis. Rituxan 1000 mg infused in 250 ml NaCl. Patient monitored closely during infusion. Premedicated with Tylenol 1000 mg PO, Antihistamine PO and Soul Medrol 100 mg given by IV push 30 minutes prior to the start of the Rituxan infusion. Patient medication supplied by [facility]     IV location: left forearm, Inserted by Aurora Harley RN  IV insertion time: 1010  Solu Medrol push given at 1015 over 2 minutes  Medication start time: 1030  Medication completion time: 1415    Patient discharged feeling fine and instructed to call the office with any post-infusion issues. Pre-Infusion Questionnaire  Has your insurance changed or have you received a new card since your last visit?  no  Have you had an infection since your last infusion? no  Since your last visit, have you been hospitalized, been to the ER, or Urgent [address] [place] [address] for any reason?  no  Have you recently had GI problems, open wounds, urinary problems, or chest pain? no  Are you currently taking antibiotics?   no  Have you recently had or are you scheduled for any surgical procedures?   no  Have you had a TB test in the last year?   yes, 12/21/22 (negative)  Did you take an antihistamine today? yes  Have you received any vaccinations recently?  no  When was your last appointment with Dr. Vianey Robin, Dr. Margaret Duong, or Dr. Wil James or Dr. Magalys Sullivan. 12/13/23  When was your last infusion? 7/12/23  12. Are you in a skilled nursing facility?       no    Reviewed and Confirmed by Michelle Stephens no

## 2024-03-11 RX ORDER — GABAPENTIN 300 MG/1
CAPSULE ORAL
Qty: 270 CAPSULE | Refills: 0 | Status: SHIPPED | OUTPATIENT
Start: 2024-03-11 | End: 2025-02-27

## 2024-05-02 ENCOUNTER — TELEMEDICINE (OUTPATIENT)
Dept: RHEUMATOLOGY | Age: 69
End: 2024-05-02
Payer: MEDICARE

## 2024-05-02 DIAGNOSIS — E55.9 HYPOVITAMINOSIS D: ICD-10-CM

## 2024-05-02 DIAGNOSIS — Z79.899 HIGH RISK MEDICATION USE: ICD-10-CM

## 2024-05-02 DIAGNOSIS — M25.511 CHRONIC RIGHT SHOULDER PAIN: ICD-10-CM

## 2024-05-02 DIAGNOSIS — Z79.899 LONG TERM CURRENT USE OF IMMUNOSUPPRESSIVE DRUG: ICD-10-CM

## 2024-05-02 DIAGNOSIS — G89.29 CHRONIC RIGHT SHOULDER PAIN: ICD-10-CM

## 2024-05-02 DIAGNOSIS — R53.83 OTHER FATIGUE: ICD-10-CM

## 2024-05-02 DIAGNOSIS — M05.9 SEROPOSITIVE RHEUMATOID ARTHRITIS (HCC): Primary | ICD-10-CM

## 2024-05-02 DIAGNOSIS — M15.9 GENERALIZED OSTEOARTHRITIS: ICD-10-CM

## 2024-05-02 DIAGNOSIS — Z79.52 LONG TERM (CURRENT) USE OF SYSTEMIC STEROIDS: ICD-10-CM

## 2024-05-02 DIAGNOSIS — R74.8 ABNORMAL LIVER ENZYMES: ICD-10-CM

## 2024-05-02 DIAGNOSIS — M54.16 RADICULOPATHY, LUMBAR REGION: ICD-10-CM

## 2024-05-02 PROCEDURE — 99215 OFFICE O/P EST HI 40 MIN: CPT | Performed by: INTERNAL MEDICINE

## 2024-05-02 PROCEDURE — 1123F ACP DISCUSS/DSCN MKR DOCD: CPT | Performed by: INTERNAL MEDICINE

## 2024-05-02 NOTE — PROGRESS NOTES
Che Moore is a 69 y.o. female who was seen by synchronous (real-time) audio-video technology.    Consent:  She and/or her healthcare decision maker is aware that this patient-initiated Telehealth encounter is a billable service, with coverage as determined by her insurance carrier. She is aware that she may receive a bill and has provided verbal consent to proceed: Yes    I was at home while conducting this encounter.           Subjective:      HPI:      Permanent history    Mrs. Moore is a very pleasant 68-year-old  lady with past medical history significant for hypertension, hyperlipidemia, hypothyroidism, osteoporosis, osteoarthritis and rheumatoid arthritis who presents for evaluation.    Was diagnosed approximately 20 years ago in [place], and initially her diagnosis was PMR as she started with hip problems and trouble walking.  Was on prednisone for about a year and felt very well on it.  Moved to [place] approximately 9 to 10 years ago.  At that time his joints started getting worse and involving more the joint of the posterior muscles.  Seen by outside rheumatologist, who did work-up and diagnosed RA.  Was started on methotrexate and prednisone this was working well for a while.  However patient felt she needed to wean off due to fatty liver, therefore slowly came off all her meds.  Still having symptoms stiffness swelling, pain and redness in joints.  However trying to take many over-the-counter herbal remedies if possible.  At one time she may have restarted methotrexate, but discontinued shortly thereafter because of worsening liver issues.  Since then has been maintained on long-term diclofenac.  Occasionally takes this with ibuprofen also.  Despite this symptoms significantly worsening in the past 18 months, now All her joint hurt. Pain is continuing to get worse. Dx with melanoma in neck, removed - doesn't go to onc - no treatments.  Right neck symptoms are severe, she  Sinusitis: Care Instructions  Your Care Instructions     Sinusitis is an infection of the lining of the sinus cavities in your head. Sinusitis often follows a cold. It causes pain and pressure in your head and face. In most cases, sinusitis gets better on its own in 1 to 2 weeks. But some mild symptoms may last for several weeks. Sometimes antibiotics are needed. Follow-up care is a key part of your treatment and safety. Be sure to make and go to all appointments, and call your doctor if you are having problems. It's also a good idea to know your test results and keep a list of the medicines you take. How can you care for yourself at home? · Take an over-the-counter pain medicine, such as acetaminophen (Tylenol), ibuprofen (Advil, Motrin), or naproxen (Aleve). Read and follow all instructions on the label. · If the doctor prescribed antibiotics, take them as directed. Do not stop taking them just because you feel better. You need to take the full course of antibiotics. · Be careful when taking over-the-counter cold or flu medicines and Tylenol at the same time. Many of these medicines have acetaminophen, which is Tylenol. Read the labels to make sure that you are not taking more than the recommended dose. Too much acetaminophen (Tylenol) can be harmful. · Breathe warm, moist air from a steamy shower, a hot bath, or a sink filled with hot water. Avoid cold, dry air. Using a humidifier in your home may help. Follow the directions for cleaning the machine. · Use saline (saltwater) nasal washes. This can help keep your nasal passages open and wash out mucus and bacteria. You can buy saline nose drops at a grocery store or drugstore. Or you can make your own at home by adding 1 teaspoon of salt and 1 teaspoon of baking soda to 2 cups of distilled water. If you make your own, fill a bulb syringe with the solution, insert the tip into your nostril, and squeeze gently. Joanne Clan your nose.   · Put a hot, wet towel or a warm gel pack on your face 3 or 4 times a day for 5 to 10 minutes each time. · Try a decongestant nasal spray like oxymetazoline (Afrin). Do not use it for more than 3 days in a row. Using it for more than 3 days can make your congestion worse. When should you call for help? Call your doctor now or seek immediate medical care if:    · You have new or worse swelling or redness in your face or around your eyes.     · You have a new or higher fever. Watch closely for changes in your health, and be sure to contact your doctor if:    · You have new or worse facial pain.     · The mucus from your nose becomes thicker (like pus) or has new blood in it.     · You are not getting better as expected. Where can you learn more? Go to http://www.gray.com/  Enter Y250 in the search box to learn more about \"Sinusitis: Care Instructions. \"  Current as of: September 8, 2021               Content Version: 13.2  © 2006-2022 [employer]wise[employer]. Care instructions adapted under license by [employer] (which disclaims liability or warranty for this information). If you have questions about a medical condition or this instruction, always ask your healthcare professional. [place] [address] any warranty or liability for your use of this information.

## 2024-05-02 NOTE — PATIENT INSTRUCTIONS
1. Labs -CBC, CMP, before next visit  2.  Urgent referral to Dr. Bryan Steele, [facility] 69-year-old lady with history of rheumatoid arthritis on rituximab with fatty liver, and worsening LFTs  3.  Prednisone 5 once daily as needed  4.   Continue Tylenol scheduled at 1000 mg every morning only for now  5.  Continue gabapentin can take 600 to 900 mg once daily at 7 PM as needed  6.   Vit d 2000 daily  7.  Return to clinic in 6 weeks with labs    6weeks, [facility] prior-mail,[facility]

## 2024-05-03 RX ORDER — PREDNISONE 5 MG/1
TABLET ORAL
Qty: 100 TABLET | Refills: 0 | Status: SHIPPED | OUTPATIENT
Start: 2024-05-03

## 2024-05-03 RX ORDER — GABAPENTIN 300 MG/1
CAPSULE ORAL
Qty: 270 CAPSULE | Refills: 0 | Status: SHIPPED | OUTPATIENT
Start: 2024-05-03 | End: 2025-04-20

## 2024-05-10 ENCOUNTER — OFFICE VISIT (OUTPATIENT)
Dept: ENDOCRINOLOGY | Age: 69
End: 2024-05-10
Payer: MEDICARE

## 2024-05-10 VITALS
WEIGHT: 147 LBS | DIASTOLIC BLOOD PRESSURE: 66 MMHG | HEART RATE: 86 BPM | OXYGEN SATURATION: 96 % | BODY MASS INDEX: 28.71 KG/M2 | SYSTOLIC BLOOD PRESSURE: 110 MMHG

## 2024-05-10 DIAGNOSIS — E03.9 PRIMARY HYPOTHYROIDISM: Primary | ICD-10-CM

## 2024-05-10 PROCEDURE — 1123F ACP DISCUSS/DSCN MKR DOCD: CPT | Performed by: INTERNAL MEDICINE

## 2024-05-10 PROCEDURE — 99213 OFFICE O/P EST LOW 20 MIN: CPT | Performed by: INTERNAL MEDICINE

## 2024-05-10 RX ORDER — LEVOTHYROXINE SODIUM 88 UG/1
TABLET ORAL
Qty: 100 TABLET | Refills: 3 | Status: SHIPPED | OUTPATIENT
Start: 2024-05-10

## 2024-05-10 RX ORDER — AMLODIPINE BESYLATE AND BENAZEPRIL HYDROCHLORIDE 5; 10 MG/1; MG/1
1 CAPSULE ORAL DAILY
COMMUNITY

## 2024-05-10 ASSESSMENT — ENCOUNTER SYMPTOMS
ROS SKIN COMMENTS: SHE REPORTS HAIR LOSS, DRY SKIN.
CONSTIPATION: 0
DIARRHEA: 0

## 2024-05-10 NOTE — PROGRESS NOTES
prednisone 5 mg daily.  She also receives Rituxan infusions every 5-6 months.  She has a history of melanoma, so her rheumatologist does not want to treat her with other biologics.  She has a history of fatty liver and her methotrexate was stopped.    Skin:         She reports hair loss, dry skin.   Neurological:  Negative for tremors.         Vital Signs:  /66 (Site: Left Upper Arm, Position: Sitting)   Pulse 86   Wt 66.7 kg (147 lb)   SpO2 96%   BMI 28.71 kg/m²     Wt Readings from Last 3 Encounters:   05/10/24 66.7 kg (147 lb)   12/13/23 66 kg (145 lb 8.1 oz)   12/13/23 65.8 kg (145 lb)       Physical Exam  Constitutional:       General: She is not in acute distress.  Neck:      Thyroid: No thyroid mass or thyromegaly.   Cardiovascular:      Rate and Rhythm: Normal rate and regular rhythm.   Lymphadenopathy:      Cervical: No cervical adenopathy.   Neurological:      Motor: No tremor.         Orders Placed This Encounter   Procedures    TSH with Reflex     Standing Status:   Future     Standing Expiration Date:   5/10/2025         Current Outpatient Medications   Medication Sig Dispense Refill    amLODIPine-benazepril (LOTREL) 5-10 MG per capsule Take 1 capsule by mouth daily      gabapentin (NEURONTIN) 300 MG capsule 600-900mg daily at 7 PM as needed 270 capsule 0    predniSONE (DELTASONE) 5 MG tablet 5mg daily as needed 100 tablet 0    clobetasol (TEMOVATE) 0.05 % external solution       Cholecalciferol 50 MCG (2000 UT) TABS Take 2.5 tablets by mouth daily      estradiol (ESTRACE) 0.5 MG tablet Take 5 tablets by mouth daily      omeprazole (PRILOSEC) 40 MG delayed release capsule Take 1 capsule by mouth every 48 hours      sertraline (ZOLOFT) 50 MG tablet Take 1 tablet by mouth daily      levothyroxine (SYNTHROID) 88 MCG tablet 1 tablet once a day with an extra tablet on Sundays 100 tablet 3     Current Facility-Administered Medications   Medication Dose Route Frequency Provider Last Rate Last Admin

## 2024-05-24 ENCOUNTER — TELEPHONE (OUTPATIENT)
Dept: RHEUMATOLOGY | Age: 69
End: 2024-05-24

## 2024-05-24 NOTE — TELEPHONE ENCOUNTER
VV 5/2/24, Appt 6/25, Included Plan, Last Labs 4/26/24, Last Rituxan was 12/13/23 and 1/3/24, GI note scanned in media tab 5/9/24, see pt message below, thanks       ----- Message from Che Moore sent at 5/24/2024  8:31 AM EDT -----  Regarding: Infusion  Contact: [phone]  Hi Crystal…I feel like it’s time for my next infusion.  My knees and hips are hurting. I saw Dr Rinaldi and I’m scheduled for an ultrasound on my liver June 24th and I follow up with her in 3 months. Could you check with Dr Ly to see if I can get the infusion scheduled?  Thank you.   Che garcia:         1. Labs -CBC, CMP, before next visit  2.  Urgent referral to Dr. Bryan Steele, [facility] 69-year-old lady with history of rheumatoid arthritis on rituximab with fatty liver, and worsening LFTs  3.  Prednisone 5 once daily as needed  4.   Continue Tylenol scheduled at 1000 mg every morning only for now  5.  Continue gabapentin can take 600 to 900 mg once daily at 7 PM as needed  6.   Vit d 2000 daily  7.  Return to clinic in 6 weeks with labs           nRBC Percent  0 % 0   nRBC Abs  0.0 - 0.2 K/uL 0.0     Specimen Collected: 04/26/24 10:17    Performed by: [facility] LABORATORY Last Resulted: 04/26/24 10:47   Received From: [facility]  Result Received: 04/29/24 13:38    View Encounter        Received Information   Contains abnormal data Comprehensive Metabolic Panel (CMP)  Order: [id number]   suggestion  Information displayed in this report may not trend or trigger automated decision support.     Component  Ref Range & Units 4 wk ago   Sodium  136 - 145 mmol/L 142   Potassium  3.5 - 5.1 mmol/L 3.2 Low    Chloride  98 - 107 mmol/L 104   CO2  22 - 29 mmol/L 22   Anion Gap  6 - 16 mmol/L 16   BUN  8 - 23 mg/dL 18   Creatinine  0.51 - 0.95 mg/dL 1.00 High    Glucose  70 - 99 mg/dL 94   Calcium  8.8 - 10.2 mg/dL 9.8   AST  10 - 35 IU/L 94 High    ALT  10 - 35 IU/L 82 High    Alkaline

## 2024-05-29 NOTE — TELEPHONE ENCOUNTER
PHONE CALL to [facility] to order her Rituxan. She receives the medication for free through the [facility] P[facility].   Spoke with Azael. He scheduled the delivery for 24.   Rx  2023. Need to provide verbal rx. Spoke with Rocco.   1000 mg infused day 0, day 15. 2 refills for a year.     PHONE CALL to patient to schedule the infusion. Scheduled for 6/10/24 for day 0, 24 for OV and Day 15 infusion.

## 2024-06-02 DIAGNOSIS — M05.9 SEROPOSITIVE RHEUMATOID ARTHRITIS (HCC): Primary | ICD-10-CM

## 2024-06-10 ENCOUNTER — NURSE ONLY (OUTPATIENT)
Dept: RHEUMATOLOGY | Age: 69
End: 2024-06-10
Payer: MEDICARE

## 2024-06-10 VITALS — TEMPERATURE: 97.3 F | SYSTOLIC BLOOD PRESSURE: 138 MMHG | DIASTOLIC BLOOD PRESSURE: 83 MMHG | HEART RATE: 73 BPM

## 2024-06-10 DIAGNOSIS — Z11.1 SCREENING EXAMINATION FOR PULMONARY TUBERCULOSIS: ICD-10-CM

## 2024-06-10 DIAGNOSIS — M05.9 SEROPOSITIVE RHEUMATOID ARTHRITIS (HCC): Primary | ICD-10-CM

## 2024-06-10 PROCEDURE — 96415 CHEMO IV INFUSION ADDL HR: CPT | Performed by: INTERNAL MEDICINE

## 2024-06-10 PROCEDURE — 96413 CHEMO IV INFUSION 1 HR: CPT | Performed by: INTERNAL MEDICINE

## 2024-06-10 PROCEDURE — 96375 TX/PRO/DX INJ NEW DRUG ADDON: CPT | Performed by: INTERNAL MEDICINE

## 2024-06-10 RX ORDER — SODIUM CHLORIDE 9 MG/ML
INJECTION, SOLUTION INTRAVENOUS CONTINUOUS
OUTPATIENT
Start: 2024-06-10

## 2024-06-10 RX ORDER — EPINEPHRINE 1 MG/ML
0.3 INJECTION, SOLUTION, CONCENTRATE INTRAVENOUS PRN
OUTPATIENT
Start: 2024-06-24

## 2024-06-10 RX ORDER — ACETAMINOPHEN 325 MG/1
650 TABLET ORAL
OUTPATIENT
Start: 2024-06-10

## 2024-06-10 RX ORDER — SODIUM CHLORIDE 9 MG/ML
INJECTION, SOLUTION INTRAVENOUS CONTINUOUS
OUTPATIENT
Start: 2024-06-24

## 2024-06-10 RX ORDER — DIPHENHYDRAMINE HYDROCHLORIDE 50 MG/ML
50 INJECTION INTRAMUSCULAR; INTRAVENOUS
OUTPATIENT
Start: 2024-06-24

## 2024-06-10 RX ORDER — ACETAMINOPHEN 325 MG/1
650 TABLET ORAL ONCE
Status: DISCONTINUED | OUTPATIENT
Start: 2024-06-10 | End: 2024-06-10 | Stop reason: HOSPADM

## 2024-06-10 RX ORDER — DIPHENHYDRAMINE HYDROCHLORIDE 50 MG/ML
50 INJECTION INTRAMUSCULAR; INTRAVENOUS ONCE
Status: DISCONTINUED | OUTPATIENT
Start: 2024-06-10 | End: 2024-06-10 | Stop reason: HOSPADM

## 2024-06-10 RX ORDER — ONDANSETRON 2 MG/ML
8 INJECTION INTRAMUSCULAR; INTRAVENOUS
OUTPATIENT
Start: 2024-06-10

## 2024-06-10 RX ORDER — ALBUTEROL SULFATE 90 UG/1
4 AEROSOL, METERED RESPIRATORY (INHALATION) PRN
OUTPATIENT
Start: 2024-06-24

## 2024-06-10 RX ORDER — ACETAMINOPHEN 325 MG/1
650 TABLET ORAL ONCE
OUTPATIENT
Start: 2024-06-24 | End: 2024-06-24

## 2024-06-10 RX ORDER — ACETAMINOPHEN 325 MG/1
650 TABLET ORAL ONCE
OUTPATIENT
Start: 2024-06-10 | End: 2024-06-10

## 2024-06-10 RX ORDER — DIPHENHYDRAMINE HYDROCHLORIDE 50 MG/ML
50 INJECTION INTRAMUSCULAR; INTRAVENOUS ONCE
OUTPATIENT
Start: 2024-06-10 | End: 2024-06-10

## 2024-06-10 RX ORDER — DIPHENHYDRAMINE HYDROCHLORIDE 50 MG/ML
50 INJECTION INTRAMUSCULAR; INTRAVENOUS ONCE
OUTPATIENT
Start: 2024-06-24 | End: 2024-06-24

## 2024-06-10 RX ORDER — ONDANSETRON 2 MG/ML
8 INJECTION INTRAMUSCULAR; INTRAVENOUS
OUTPATIENT
Start: 2024-06-24

## 2024-06-10 RX ORDER — EPINEPHRINE 1 MG/ML
0.3 INJECTION, SOLUTION, CONCENTRATE INTRAVENOUS PRN
OUTPATIENT
Start: 2024-06-10

## 2024-06-10 RX ORDER — MEPERIDINE HYDROCHLORIDE 25 MG/ML
12.5 INJECTION INTRAMUSCULAR; INTRAVENOUS; SUBCUTANEOUS PRN
OUTPATIENT
Start: 2024-06-24

## 2024-06-10 RX ORDER — ACETAMINOPHEN 325 MG/1
650 TABLET ORAL
OUTPATIENT
Start: 2024-06-24

## 2024-06-10 RX ORDER — MEPERIDINE HYDROCHLORIDE 25 MG/ML
12.5 INJECTION INTRAMUSCULAR; INTRAVENOUS; SUBCUTANEOUS PRN
OUTPATIENT
Start: 2024-06-10

## 2024-06-10 RX ORDER — DIPHENHYDRAMINE HYDROCHLORIDE 50 MG/ML
50 INJECTION INTRAMUSCULAR; INTRAVENOUS
OUTPATIENT
Start: 2024-06-10

## 2024-06-10 RX ORDER — ALBUTEROL SULFATE 90 UG/1
4 AEROSOL, METERED RESPIRATORY (INHALATION) PRN
OUTPATIENT
Start: 2024-06-10

## 2024-06-10 NOTE — PROGRESS NOTES
[facility] RHEUMATOLOGY  [address] [place] [address], [address]  [place] [address]  Office : [phone], Fax: [phone]       Rituxan infusion today. Course # 6 , treatment # 1 . Patient's diagnosis is Rheumatoid Arthritis .   Rituxan 1000  mg infused in 250  ml NaCl. Patient monitored closely during infusion.   Premedicated with Tylenol 1000 mg PO, Antihistamine PO and Soul Medrol 100  mg given by IV push 30 minutes prior to the start of the Rituxan infusion.   Pt supplied by [facility]  IV location: left lower FA, Inserted by Shavonne Ruffin RN  IV insertion time: 1000   Solu Medrol push given at 1000  over 2 minutes  Medication start time: 1025   Medication completion time: 1358     Patient discharged feeling good and instructed to call the office with any post-infusion issues.     Pre-Infusion Questionnaire  Has your insurance changed or have you received a new card since your last visit?  no  Have you had any infections since your last infusion?   yes, I April had PNA in hospital 5 days   Since your last visit, have you been hospitalized, been to the ER, or Urgent Care Center for any reason?  yes,  for PNA   Have you recently had GI problems, open wounds, urinary problems, or chest pain?   no  Are you currently taking antibiotics?   no  Have you recently had or are you scheduled for any surgical procedures?   no  Have you had a TB test in the last year?   yes, 12/21/2022   [facility] FOR TB TODAY   Did you premedicate for today's infusion?  yes, tylenol and Benadryl   Have you received any vaccinations in the last 6 months, or planning to receive in the next 3 months: COVID, Flu, Pheumonia, Shingles?  no  When was your last appointment with Dr. Boudreaux, Dr. Ly, or Dr. Castro or Dr. Zhang.   5/2/2024   When was your last infusion?        1/3/2024  12. Are you in skilled nursing facility, Rehab, or living at a nursing home?      no    Reviewed and Confirmed by Che Moore

## 2024-06-14 LAB
M TB IFN-G BLD-IMP: ABNORMAL
M TB IFN-G CD4+ T-CELLS BLD-ACNC: 0 IU/ML
M TBIFN-G CD4+ CD8+T-CELLS BLD-ACNC: 0 IU/ML
QUANTIFERON CRITERIA: ABNORMAL
QUANTIFERON MITOGEN VALUE: 0.07 IU/ML
QUANTIFERON NIL VALUE: 0 IU/ML

## 2024-06-21 LAB
ALBUMIN SERPL-MCNC: 4.6 G/DL (ref 3.8–4.8)
ALP SERPL-CCNC: 134 IU/L (ref 44–121)
ALT SERPL-CCNC: 45 IU/L (ref 0–32)
AST SERPL-CCNC: 42 IU/L (ref 0–40)
BASOPHILS # BLD AUTO: 0.1 X10E3/UL (ref 0–0.2)
BASOPHILS NFR BLD AUTO: 1 %
BILIRUB SERPL-MCNC: 0.6 MG/DL (ref 0–1.2)
BUN SERPL-MCNC: 21 MG/DL (ref 8–27)
BUN/CREAT SERPL: 24 (ref 12–28)
CALCIUM SERPL-MCNC: 9.3 MG/DL (ref 8.7–10.3)
CHLORIDE SERPL-SCNC: 104 MMOL/L (ref 96–106)
CO2 SERPL-SCNC: 22 MMOL/L (ref 20–29)
CREAT SERPL-MCNC: 0.88 MG/DL (ref 0.57–1)
EGFRCR SERPLBLD CKD-EPI 2021: 70 ML/MIN/1.73
EOSINOPHIL # BLD AUTO: 0.2 X10E3/UL (ref 0–0.4)
EOSINOPHIL NFR BLD AUTO: 2 %
ERYTHROCYTE [DISTWIDTH] IN BLOOD BY AUTOMATED COUNT: 11.7 % (ref 11.7–15.4)
GLOBULIN SER CALC-MCNC: 2.3 G/DL (ref 1.5–4.5)
GLUCOSE SERPL-MCNC: 86 MG/DL (ref 70–99)
HCT VFR BLD AUTO: 40.9 % (ref 34–46.6)
HGB BLD-MCNC: 13.9 G/DL (ref 11.1–15.9)
IMM GRANULOCYTES # BLD AUTO: 0 X10E3/UL (ref 0–0.1)
IMM GRANULOCYTES NFR BLD AUTO: 0 %
LYMPHOCYTES # BLD AUTO: 2.2 X10E3/UL (ref 0.7–3.1)
LYMPHOCYTES NFR BLD AUTO: 24 %
MCH RBC QN AUTO: 32.6 PG (ref 26.6–33)
MCHC RBC AUTO-ENTMCNC: 34 G/DL (ref 31.5–35.7)
MCV RBC AUTO: 96 FL (ref 79–97)
MONOCYTES # BLD AUTO: 0.7 X10E3/UL (ref 0.1–0.9)
MONOCYTES NFR BLD AUTO: 8 %
NEUTROPHILS # BLD AUTO: 5.8 X10E3/UL (ref 1.4–7)
NEUTROPHILS NFR BLD AUTO: 65 %
PLATELET # BLD AUTO: 273 X10E3/UL (ref 150–450)
POTASSIUM SERPL-SCNC: 4 MMOL/L (ref 3.5–5.2)
PROT SERPL-MCNC: 6.9 G/DL (ref 6–8.5)
RBC # BLD AUTO: 4.27 X10E6/UL (ref 3.77–5.28)
SODIUM SERPL-SCNC: 141 MMOL/L (ref 134–144)
SPECIMEN STATUS REPORT: NORMAL
WBC # BLD AUTO: 8.8 X10E3/UL (ref 3.4–10.8)

## 2024-06-25 ENCOUNTER — TELEPHONE (OUTPATIENT)
Dept: RHEUMATOLOGY | Age: 69
End: 2024-06-25

## 2024-06-25 ENCOUNTER — NURSE ONLY (OUTPATIENT)
Dept: RHEUMATOLOGY | Age: 69
End: 2024-06-25
Payer: MEDICARE

## 2024-06-25 ENCOUNTER — OFFICE VISIT (OUTPATIENT)
Dept: RHEUMATOLOGY | Age: 69
End: 2024-06-25
Payer: MEDICARE

## 2024-06-25 ENCOUNTER — HOSPITAL ENCOUNTER (OUTPATIENT)
Dept: GENERAL RADIOLOGY | Age: 69
Discharge: HOME | End: 2024-06-28
Payer: MEDICARE

## 2024-06-25 VITALS
WEIGHT: 148 LBS | HEIGHT: 60 IN | DIASTOLIC BLOOD PRESSURE: 73 MMHG | BODY MASS INDEX: 29.06 KG/M2 | HEART RATE: 73 BPM | SYSTOLIC BLOOD PRESSURE: 137 MMHG

## 2024-06-25 VITALS — DIASTOLIC BLOOD PRESSURE: 89 MMHG | TEMPERATURE: 97.7 F | SYSTOLIC BLOOD PRESSURE: 127 MMHG | HEART RATE: 73 BPM

## 2024-06-25 DIAGNOSIS — Z79.899 HIGH RISK MEDICATION USE: ICD-10-CM

## 2024-06-25 DIAGNOSIS — E55.9 HYPOVITAMINOSIS D: ICD-10-CM

## 2024-06-25 DIAGNOSIS — R79.9 ABNORMAL BLOOD FINDING: ICD-10-CM

## 2024-06-25 DIAGNOSIS — Z79.899 LONG TERM CURRENT USE OF IMMUNOSUPPRESSIVE DRUG: ICD-10-CM

## 2024-06-25 DIAGNOSIS — R74.8 ABNORMAL LIVER ENZYMES: ICD-10-CM

## 2024-06-25 DIAGNOSIS — Z79.52 LONG TERM (CURRENT) USE OF SYSTEMIC STEROIDS: ICD-10-CM

## 2024-06-25 DIAGNOSIS — M05.9 SEROPOSITIVE RHEUMATOID ARTHRITIS (HCC): Primary | ICD-10-CM

## 2024-06-25 DIAGNOSIS — M05.9 SEROPOSITIVE RHEUMATOID ARTHRITIS (HCC): ICD-10-CM

## 2024-06-25 DIAGNOSIS — M15.9 GENERALIZED OSTEOARTHRITIS: ICD-10-CM

## 2024-06-25 DIAGNOSIS — R53.83 OTHER FATIGUE: ICD-10-CM

## 2024-06-25 PROCEDURE — 71046 X-RAY EXAM CHEST 2 VIEWS: CPT

## 2024-06-25 PROCEDURE — 96415 CHEMO IV INFUSION ADDL HR: CPT | Performed by: INTERNAL MEDICINE

## 2024-06-25 PROCEDURE — 99215 OFFICE O/P EST HI 40 MIN: CPT | Performed by: INTERNAL MEDICINE

## 2024-06-25 PROCEDURE — 96413 CHEMO IV INFUSION 1 HR: CPT | Performed by: INTERNAL MEDICINE

## 2024-06-25 PROCEDURE — 1123F ACP DISCUSS/DSCN MKR DOCD: CPT | Performed by: INTERNAL MEDICINE

## 2024-06-25 PROCEDURE — 96375 TX/PRO/DX INJ NEW DRUG ADDON: CPT | Performed by: INTERNAL MEDICINE

## 2024-06-25 PROCEDURE — G2211 COMPLEX E/M VISIT ADD ON: HCPCS | Performed by: INTERNAL MEDICINE

## 2024-06-25 RX ORDER — SODIUM CHLORIDE 9 MG/ML
INJECTION, SOLUTION INTRAVENOUS CONTINUOUS
OUTPATIENT
Start: 2024-12-09

## 2024-06-25 RX ORDER — ACETAMINOPHEN 325 MG/1
650 TABLET ORAL ONCE
OUTPATIENT
Start: 2024-12-09 | End: 2024-12-09

## 2024-06-25 RX ORDER — ACETAMINOPHEN 325 MG/1
650 TABLET ORAL ONCE
Status: DISCONTINUED | OUTPATIENT
Start: 2024-06-25 | End: 2024-06-27 | Stop reason: HOSPADM

## 2024-06-25 RX ORDER — DIPHENHYDRAMINE HYDROCHLORIDE 50 MG/ML
50 INJECTION INTRAMUSCULAR; INTRAVENOUS ONCE
OUTPATIENT
Start: 2024-12-09 | End: 2024-12-09

## 2024-06-25 RX ORDER — ALBUTEROL SULFATE 90 UG/1
4 AEROSOL, METERED RESPIRATORY (INHALATION) PRN
OUTPATIENT
Start: 2024-12-09

## 2024-06-25 RX ORDER — MEPERIDINE HYDROCHLORIDE 25 MG/ML
12.5 INJECTION INTRAMUSCULAR; INTRAVENOUS; SUBCUTANEOUS PRN
OUTPATIENT
Start: 2024-12-09

## 2024-06-25 RX ORDER — DIPHENHYDRAMINE HYDROCHLORIDE 50 MG/ML
50 INJECTION INTRAMUSCULAR; INTRAVENOUS ONCE
Status: DISCONTINUED | OUTPATIENT
Start: 2024-06-25 | End: 2024-06-27 | Stop reason: HOSPADM

## 2024-06-25 RX ORDER — ONDANSETRON 2 MG/ML
8 INJECTION INTRAMUSCULAR; INTRAVENOUS
OUTPATIENT
Start: 2024-12-09

## 2024-06-25 RX ORDER — DIPHENHYDRAMINE HYDROCHLORIDE 50 MG/ML
50 INJECTION INTRAMUSCULAR; INTRAVENOUS
OUTPATIENT
Start: 2024-12-09

## 2024-06-25 RX ORDER — ACETAMINOPHEN 325 MG/1
650 TABLET ORAL
OUTPATIENT
Start: 2024-12-09

## 2024-06-25 RX ORDER — EPINEPHRINE 1 MG/ML
0.3 INJECTION, SOLUTION, CONCENTRATE INTRAVENOUS PRN
OUTPATIENT
Start: 2024-12-09

## 2024-06-25 NOTE — PROGRESS NOTES
Subjective:      HPI:      Permanent history    Mrs. Moore is a very pleasant 69-year-old  lady with past medical history significant for hypertension, hyperlipidemia, hypothyroidism, osteoporosis, osteoarthritis and rheumatoid arthritis who presents for evaluation.    Was diagnosed approximately 20 years ago in [place], and initially her diagnosis was PMR as she started with hip problems and trouble walking.  Was on prednisone for about a year and felt very well on it.  Moved to [place] approximately 9 to 10 years ago.  At that time his joints started getting worse and involving more the joint of the posterior muscles.  Seen by outside rheumatologist, who did work-up and diagnosed RA.  Was started on methotrexate and prednisone this was working well for a while.  However patient felt she needed to wean off due to fatty liver, therefore slowly came off all her meds.  Still having symptoms stiffness swelling, pain and redness in joints.  However trying to take many over-the-counter herbal remedies if possible.  At one time she may have restarted methotrexate, but discontinued shortly thereafter because of worsening liver issues.  Since then has been maintained on long-term diclofenac.  Occasionally takes this with ibuprofen also.  Despite this symptoms significantly worsening in the past 18 months, now All her joint hurt. Pain is continuing to get worse. Dx with melanoma in neck, removed - doesn't go to onc - no treatments.  Right neck symptoms are severe, she cant get down on her knees.  Worse first in the morning with morning stiffness lasting up to 30 minutes.  Generally improves with movement worse with prolonged inactivity.    Family history significant for, 1 daughter- healthy.  Mom with polychondritis,  young.Dad with cardio issues-passed away in 70s. 1 brother- had prostate cancer . Mom had thyroid issue. mamadou has thyroid issues.     Had covid 2021 - after vaccinated - mild

## 2024-06-25 NOTE — TELEPHONE ENCOUNTER
Pt seen today 6/25/24, CXR results below for review    XR CHEST (2 VW)  Order: [id number]  Status: Final result       Visible to patient: Yes (not seen)       Next appt: 08/15/2024 at 10:40 AM in Endocrinology (Hipolito Umaña MD)       Dx: Abnormal blood finding; High risk med...    0 Result Notes  Details    Reading Physician Reading Date Result Priority   Cydney Ruiz MD 6/25/2024      Narrative & Impression  Chest X-ray     INDICATION: Indeterminate TB test     COMPARISON:  None     TECHNIQUE: PA and lateral views of the chest were obtained.     FINDINGS: The lungs are clear. There are no infiltrates or effusions.  The heart  size is normal.  The bony thorax is intact.       IMPRESSION:  No acute findings in the chest        Electronically signed by Cydney Ruiz           Specimen Collected: 06/25/24 14:34 EDT

## 2024-06-25 NOTE — PROGRESS NOTES
[facility] RHEUMATOLOGY  [address] [place] [address], [address]  [place] [address]  Office : [phone], Fax: [phone]       Rituxan infusion today. Course # 6, treatment # 2. Patient's diagnosis is Rheumatoid Arthritis.   Rituxan 1000 mg infused in 250 ml NaCl. Patient monitored closely during infusion.   Premedicated with Tylenol 1000 mg PO, Antihistamine PO and Soul Medrol 100 mg given by IV push 30 minutes prior to the start of the Rituxan infusion.     IV location: Left hand/wrist, Inserted by Shavonne Ruffin RN  IV insertion time: 1000  Solu Medrol push given at 1010 over 2 minutes  Medication start time: 1040  Medication completion time: 1410     Patient discharged feeling good  and instructed to call the office with any post-infusion issues.     Pre-Infusion Questionnaire  Has your insurance changed or have you received a new card since your last visit?  no  Have you had any infections since your last infusion?   no  Since your last visit, have you been hospitalized, been to the ER, or Urgent Care Center for any reason?  no  Have you recently had GI problems, open wounds, urinary problems, or chest pain?   no  Are you currently taking antibiotics?   no  Have you recently had or are you scheduled for any surgical procedures?   no  Have you had a TB test in the last year?   yes, 6/10/2024  indeterminate  Did you premedicate for today's infusion?  yes, Tylenol and Benadryl  Have you received any vaccinations in the last 6 months, or planning to receive in the next 3 months: COVID, Flu, Pheumonia, Shingles?  no  When was your last appointment with Dr. Boudreaux, Dr. Ly, or Dr. Castro or Dr. Zhang.   6/25/2024  When was your last infusion?        6/10/2024  12. Are you in skilled nursing facility, Rehab, or living at a nursing home?      no    Reviewed and Confirmed by Che Moore

## 2024-06-25 NOTE — PATIENT INSTRUCTIONS
1. Labs -CBC, CMP, vitamin D before next visit  2.  Chest x-ray -PA and lateral, new TB QuantiFERON indeterminant, previously negative patient otherwise asymptomatic  3.  Prednisone 5 once daily as needed  4.   Continue Tylenol scheduled at 1000 mg every morning only for now  5.  Continue gabapentin can take 600 to 900 mg once daily at 7 PM as needed  6.   Vit d 2000 daily  7.  Return to clinic in 3-4 months  with labs    4MO, Labs [place] prior-w pt

## 2024-06-26 RX ORDER — GABAPENTIN 300 MG/1
CAPSULE ORAL
Qty: 270 CAPSULE | Refills: 1 | Status: SHIPPED | OUTPATIENT
Start: 2024-06-26 | End: 2025-06-12

## 2024-06-26 RX ORDER — PREDNISONE 5 MG/1
TABLET ORAL
Qty: 100 TABLET | Refills: 1 | Status: SHIPPED | OUTPATIENT
Start: 2024-06-26

## 2024-07-23 ENCOUNTER — PATIENT MESSAGE (OUTPATIENT)
Dept: ENDOCRINOLOGY | Age: 69
End: 2024-07-23

## 2024-07-23 DIAGNOSIS — E03.9 PRIMARY HYPOTHYROIDISM: Primary | ICD-10-CM

## 2024-07-26 ENCOUNTER — PATIENT MESSAGE (OUTPATIENT)
Dept: ENDOCRINOLOGY | Age: 69
End: 2024-07-26

## 2024-07-26 DIAGNOSIS — E03.9 PRIMARY HYPOTHYROIDISM: ICD-10-CM

## 2024-07-26 LAB
T4 FREE SERPL-MCNC: 1.75 NG/DL (ref 0.82–1.77)
TSH SERPL DL<=0.005 MIU/L-ACNC: 0.34 UIU/ML (ref 0.45–4.5)

## 2024-07-26 RX ORDER — LEVOTHYROXINE SODIUM 88 UG/1
TABLET ORAL
Qty: 100 TABLET | Refills: 3 | Status: SHIPPED | OUTPATIENT
Start: 2024-07-26

## 2024-08-06 ENCOUNTER — TELEPHONE (OUTPATIENT)
Dept: ENDOCRINOLOGY | Age: 69
End: 2024-08-06

## 2024-08-06 DIAGNOSIS — E03.9 PRIMARY HYPOTHYROIDISM: Primary | ICD-10-CM

## 2024-08-06 NOTE — TELEPHONE ENCOUNTER
Patient called and rescheduled her appt, it's in January, She asked for new lab orders be mailed to her prior to appointment.

## 2024-10-19 LAB
25(OH)D3+25(OH)D2 SERPL-MCNC: 58.1 NG/ML (ref 30–100)
ALBUMIN SERPL-MCNC: 4.7 G/DL (ref 3.8–4.8)
ALP SERPL-CCNC: 141 IU/L (ref 44–121)
ALT SERPL-CCNC: 36 IU/L (ref 0–32)
AST SERPL-CCNC: 41 IU/L (ref 0–40)
BASOPHILS # BLD AUTO: 0.1 X10E3/UL (ref 0–0.2)
BASOPHILS NFR BLD AUTO: 1 %
BILIRUB SERPL-MCNC: 0.6 MG/DL (ref 0–1.2)
BUN SERPL-MCNC: 26 MG/DL (ref 8–27)
BUN/CREAT SERPL: 22 (ref 12–28)
CALCIUM SERPL-MCNC: 9.5 MG/DL (ref 8.7–10.3)
CHLORIDE SERPL-SCNC: 102 MMOL/L (ref 96–106)
CO2 SERPL-SCNC: 24 MMOL/L (ref 20–29)
CREAT SERPL-MCNC: 1.19 MG/DL (ref 0.57–1)
EGFRCR SERPLBLD CKD-EPI 2021: 49 ML/MIN/1.73
EOSINOPHIL # BLD AUTO: 0.2 X10E3/UL (ref 0–0.4)
EOSINOPHIL NFR BLD AUTO: 2 %
ERYTHROCYTE [DISTWIDTH] IN BLOOD BY AUTOMATED COUNT: 11.9 % (ref 11.7–15.4)
GLOBULIN SER CALC-MCNC: 2 G/DL (ref 1.5–4.5)
GLUCOSE SERPL-MCNC: 111 MG/DL (ref 70–99)
HCT VFR BLD AUTO: 38.7 % (ref 34–46.6)
HGB BLD-MCNC: 13 G/DL (ref 11.1–15.9)
IMM GRANULOCYTES # BLD AUTO: 0.1 X10E3/UL (ref 0–0.1)
IMM GRANULOCYTES NFR BLD AUTO: 1 %
LYMPHOCYTES # BLD AUTO: 2.7 X10E3/UL (ref 0.7–3.1)
LYMPHOCYTES NFR BLD AUTO: 28 %
MCH RBC QN AUTO: 33.8 PG (ref 26.6–33)
MCHC RBC AUTO-ENTMCNC: 33.6 G/DL (ref 31.5–35.7)
MCV RBC AUTO: 101 FL (ref 79–97)
MONOCYTES # BLD AUTO: 0.6 X10E3/UL (ref 0.1–0.9)
MONOCYTES NFR BLD AUTO: 6 %
NEUTROPHILS # BLD AUTO: 5.9 X10E3/UL (ref 1.4–7)
NEUTROPHILS NFR BLD AUTO: 62 %
PLATELET # BLD AUTO: 219 X10E3/UL (ref 150–450)
POTASSIUM SERPL-SCNC: 3.8 MMOL/L (ref 3.5–5.2)
PROT SERPL-MCNC: 6.7 G/DL (ref 6–8.5)
RBC # BLD AUTO: 3.85 X10E6/UL (ref 3.77–5.28)
SODIUM SERPL-SCNC: 142 MMOL/L (ref 134–144)
SPECIMEN STATUS REPORT: NORMAL
WBC # BLD AUTO: 9.5 X10E3/UL (ref 3.4–10.8)

## 2024-10-23 ENCOUNTER — OFFICE VISIT (OUTPATIENT)
Dept: RHEUMATOLOGY | Age: 69
End: 2024-10-23

## 2024-10-23 VITALS
HEART RATE: 92 BPM | TEMPERATURE: 98 F | DIASTOLIC BLOOD PRESSURE: 82 MMHG | OXYGEN SATURATION: 97 % | WEIGHT: 148.4 LBS | SYSTOLIC BLOOD PRESSURE: 132 MMHG | BODY MASS INDEX: 29.13 KG/M2 | HEIGHT: 60 IN

## 2024-10-23 DIAGNOSIS — E55.9 HYPOVITAMINOSIS D: ICD-10-CM

## 2024-10-23 DIAGNOSIS — R74.8 ABNORMAL LIVER ENZYMES: ICD-10-CM

## 2024-10-23 DIAGNOSIS — M05.9 SEROPOSITIVE RHEUMATOID ARTHRITIS (HCC): Primary | ICD-10-CM

## 2024-10-23 DIAGNOSIS — R79.9 ABNORMAL BLOOD FINDING: ICD-10-CM

## 2024-10-23 DIAGNOSIS — R53.83 OTHER FATIGUE: ICD-10-CM

## 2024-10-23 DIAGNOSIS — Z79.52 LONG TERM (CURRENT) USE OF SYSTEMIC STEROIDS: ICD-10-CM

## 2024-10-23 DIAGNOSIS — R26.9 GAIT DIFFICULTY: ICD-10-CM

## 2024-10-23 DIAGNOSIS — Z79.899 LONG TERM CURRENT USE OF IMMUNOSUPPRESSIVE DRUG: ICD-10-CM

## 2024-10-23 DIAGNOSIS — R29.6 FALLS: ICD-10-CM

## 2024-10-23 DIAGNOSIS — M15.9 GENERALIZED OSTEOARTHRITIS: ICD-10-CM

## 2024-10-23 DIAGNOSIS — Z79.899 HIGH RISK MEDICATION USE: ICD-10-CM

## 2024-10-23 DIAGNOSIS — M54.16 RADICULOPATHY, LUMBAR REGION: ICD-10-CM

## 2024-10-23 NOTE — PATIENT INSTRUCTIONS
1. Labs -CBC, CMP, vitamin D before next visit  2.   Stop sertraline  3.  Start Cymbalta 30 mg once daily starting in the morning after week increase this to 60 mg daily thereafter  4.  PT OT please assess and treat gait, balance/coordination and strengthening -69-year-old female with history of rheumatoid arthritis and 2 falls in the past 4 months  5. Prednisone 5 once daily as needed  6. continue gabapentin 600 mg once daily at 6 to 7 PM after about 2 weeks can decrease to 300 mg once daily  Can take 600 to 900 mg once daily at 7 PM as needed  7.  Continue vitamin D 5000 units daily  8.  Schedule next rituximab early December already dosing at 1000 g day 1 and repeat day 15.  9.  Return to clinic in 3-4 months  with labs

## 2024-10-23 NOTE — PROGRESS NOTES
Subjective:      HPI:      Permanent history    Mrs. Moore is a very pleasant 69-year-old  lady with past medical history significant for hypertension, hyperlipidemia, hypothyroidism, osteoporosis, osteoarthritis and rheumatoid arthritis who presents for evaluation.    Was diagnosed approximately 20 years ago in [place], and initially her diagnosis was PMR as she started with hip problems and trouble walking.  Was on prednisone for about a year and felt very well on it.  Moved to [place] approximately 9 to 10 years ago.  At that time his joints started getting worse and involving more the joint of the posterior muscles.  Seen by outside rheumatologist, who did work-up and diagnosed RA.  Was started on methotrexate and prednisone this was working well for a while.  However patient felt she needed to wean off due to fatty liver, therefore slowly came off all her meds.  Still having symptoms stiffness swelling, pain and redness in joints.  However trying to take many over-the-counter herbal remedies if possible.  At one time she may have restarted methotrexate, but discontinued shortly thereafter because of worsening liver issues.  Since then has been maintained on long-term diclofenac.  Occasionally takes this with ibuprofen also.  Despite this symptoms significantly worsening in the past 18 months, now All her joint hurt. Pain is continuing to get worse. Dx with melanoma in neck, removed - doesn't go to onc - no treatments.  Right neck symptoms are severe, she cant get down on her knees.  Worse first in the morning with morning stiffness lasting up to 30 minutes.  Generally improves with movement worse with prolonged inactivity.    Family history significant for, 1 daughter- healthy.  Mom with polychondritis,  young.Dad with cardio issues-passed away in 70s. 1 brother- had prostate cancer . Mom had thyroid issue. mamadou has thyroid issues.     Had covid 2021 - after vaccinated - mild

## 2024-10-26 RX ORDER — GABAPENTIN 300 MG/1
CAPSULE ORAL
Qty: 270 CAPSULE | Refills: 1 | Status: SHIPPED | OUTPATIENT
Start: 2024-10-26 | End: 2025-10-09

## 2024-10-26 RX ORDER — PREDNISONE 5 MG/1
TABLET ORAL
Qty: 100 TABLET | Refills: 1 | Status: SHIPPED | OUTPATIENT
Start: 2024-10-26

## 2024-10-26 RX ORDER — DULOXETIN HYDROCHLORIDE 30 MG/1
CAPSULE, DELAYED RELEASE ORAL
Qty: 30 CAPSULE | Refills: 3 | Status: SHIPPED | OUTPATIENT
Start: 2024-10-26

## 2024-10-29 RX ORDER — DULOXETIN HYDROCHLORIDE 60 MG/1
60 CAPSULE, DELAYED RELEASE ORAL DAILY
Qty: 90 CAPSULE | Refills: 1 | Status: SHIPPED | OUTPATIENT
Start: 2024-10-29

## 2024-10-29 NOTE — TELEPHONE ENCOUNTER
OV 10/23, Pended Rx for Cymbalta 60mg-it wasn't sent at visit, thanks    Plan:         1. Labs -CBC, CMP, vitamin D before next visit  2.   Stop sertraline  3.  Start Cymbalta 30 mg once daily starting in the morning after week increase this to 60 mg daily thereafter  4.  PT OT please assess and treat gait, balance/coordination and strengthening -69-year-old female with history of rheumatoid arthritis and 2 falls in the past 4 months  5. Prednisone 5 once daily as needed  6. continue gabapentin 600 mg once daily at 6 to 7 PM after about 2 weeks can decrease to 300 mg once daily  Can take 600 to 900 mg once daily at 7 PM as needed  7.  Continue vitamin D 5000 units daily  8.  Schedule next rituximab early December already dosing at 1000 g day 1 and repeat day 15.  9.  Return to clinic in 3-4 months  with labs

## 2024-11-04 ENCOUNTER — TELEPHONE (OUTPATIENT)
Dept: RHEUMATOLOGY | Age: 69
End: 2024-11-04

## 2024-11-04 NOTE — TELEPHONE ENCOUNTER
----- Message from JC PERES MA sent at 10/23/2024 10:02 PM EDT -----  Regarding: rituxan  Schedule next rituximab early December already dosing at 1000 g day 1 and repeat day 15.  Need to get med from [facility]

## 2024-11-04 NOTE — TELEPHONE ENCOUNTER
PHONE CALL to [employer] to order patient's Rituxan. Spoke with Jose.   Rx is active with 2 refills. Enrollment is on hold. Called [facility] to see why?  Spoke with Mey at [facility].   Patient Prescriber form needs to be filled out and faxed in. Filled out online and printed. Will have Dr. Ly sign tomorrow.

## 2024-11-15 NOTE — TELEPHONE ENCOUNTER
Form faxed to [facility] on 11/6/24.   PHONE CALL to [employer] to order Rituxan to be delivered to the office.     The zip we have is not what they have on file.   To be delivered to the office 12/3/24 order # [id number].   PHONE CALL to patient to schedule the appt for the Rituxan. Scheduled her for 12/4/24 10:00  Reordered rituxan in therapy plan and sent to Dr. Ly for sig.

## 2024-12-04 ENCOUNTER — NURSE ONLY (OUTPATIENT)
Dept: RHEUMATOLOGY | Age: 69
End: 2024-12-04
Payer: MEDICARE

## 2024-12-04 VITALS
HEART RATE: 95 BPM | SYSTOLIC BLOOD PRESSURE: 141 MMHG | DIASTOLIC BLOOD PRESSURE: 89 MMHG | BODY MASS INDEX: 28.12 KG/M2 | WEIGHT: 144 LBS | TEMPERATURE: 97.5 F

## 2024-12-04 DIAGNOSIS — M05.9 SEROPOSITIVE RHEUMATOID ARTHRITIS (HCC): Primary | ICD-10-CM

## 2024-12-04 PROCEDURE — 96375 TX/PRO/DX INJ NEW DRUG ADDON: CPT | Performed by: INTERNAL MEDICINE

## 2024-12-04 PROCEDURE — 96415 CHEMO IV INFUSION ADDL HR: CPT | Performed by: INTERNAL MEDICINE

## 2024-12-04 PROCEDURE — 96413 CHEMO IV INFUSION 1 HR: CPT | Performed by: INTERNAL MEDICINE

## 2024-12-04 RX ORDER — EPINEPHRINE 1 MG/ML
0.3 INJECTION, SOLUTION, CONCENTRATE INTRAVENOUS PRN
OUTPATIENT
Start: 2024-12-11

## 2024-12-04 RX ORDER — DIPHENHYDRAMINE HYDROCHLORIDE 50 MG/ML
50 INJECTION INTRAMUSCULAR; INTRAVENOUS ONCE
OUTPATIENT
Start: 2024-12-11 | End: 2024-12-11

## 2024-12-04 RX ORDER — SODIUM CHLORIDE 9 MG/ML
INJECTION, SOLUTION INTRAVENOUS CONTINUOUS
OUTPATIENT
Start: 2024-12-11

## 2024-12-04 RX ORDER — DIPHENHYDRAMINE HYDROCHLORIDE 50 MG/ML
50 INJECTION INTRAMUSCULAR; INTRAVENOUS
OUTPATIENT
Start: 2024-12-11

## 2024-12-04 RX ORDER — SODIUM CHLORIDE 9 MG/ML
INJECTION, SOLUTION INTRAVENOUS CONTINUOUS
Status: DISCONTINUED | OUTPATIENT
Start: 2024-12-04 | End: 2024-12-04 | Stop reason: HOSPADM

## 2024-12-04 RX ORDER — DIPHENHYDRAMINE HYDROCHLORIDE 50 MG/ML
50 INJECTION INTRAMUSCULAR; INTRAVENOUS ONCE
Status: DISCONTINUED | OUTPATIENT
Start: 2024-12-04 | End: 2024-12-04 | Stop reason: HOSPADM

## 2024-12-04 RX ORDER — ALBUTEROL SULFATE 90 UG/1
4 INHALANT RESPIRATORY (INHALATION) PRN
OUTPATIENT
Start: 2024-12-11

## 2024-12-04 RX ORDER — ACETAMINOPHEN 325 MG/1
650 TABLET ORAL
OUTPATIENT
Start: 2024-12-11

## 2024-12-04 RX ORDER — ONDANSETRON 2 MG/ML
8 INJECTION INTRAMUSCULAR; INTRAVENOUS
OUTPATIENT
Start: 2024-12-11

## 2024-12-04 RX ORDER — ACETAMINOPHEN 325 MG/1
650 TABLET ORAL ONCE
Status: DISCONTINUED | OUTPATIENT
Start: 2024-12-04 | End: 2024-12-04 | Stop reason: HOSPADM

## 2024-12-04 RX ORDER — HYDROCORTISONE SODIUM SUCCINATE 100 MG/2ML
100 INJECTION INTRAMUSCULAR; INTRAVENOUS
OUTPATIENT
Start: 2024-12-11

## 2024-12-04 RX ORDER — MEPERIDINE HYDROCHLORIDE 50 MG/ML
12.5 INJECTION INTRAMUSCULAR; INTRAVENOUS; SUBCUTANEOUS PRN
OUTPATIENT
Start: 2024-12-11

## 2024-12-04 RX ORDER — EPINEPHRINE 1 MG/ML
0.3 INJECTION, SOLUTION, CONCENTRATE INTRAVENOUS PRN
Status: DISCONTINUED | OUTPATIENT
Start: 2024-12-04 | End: 2024-12-04 | Stop reason: HOSPADM

## 2024-12-04 RX ORDER — MEPERIDINE HYDROCHLORIDE 50 MG/ML
12.5 INJECTION INTRAMUSCULAR; INTRAVENOUS; SUBCUTANEOUS PRN
Status: DISCONTINUED | OUTPATIENT
Start: 2024-12-04 | End: 2024-12-04 | Stop reason: HOSPADM

## 2024-12-04 RX ORDER — FAMOTIDINE 10 MG/ML
20 INJECTION, SOLUTION INTRAVENOUS
OUTPATIENT
Start: 2024-12-11

## 2024-12-04 RX ORDER — ACETAMINOPHEN 325 MG/1
650 TABLET ORAL ONCE
OUTPATIENT
Start: 2024-12-11 | End: 2024-12-11

## 2024-12-04 RX ORDER — HYDROCORTISONE SODIUM SUCCINATE 100 MG/2ML
100 INJECTION INTRAMUSCULAR; INTRAVENOUS
Status: DISCONTINUED | OUTPATIENT
Start: 2024-12-04 | End: 2024-12-04 | Stop reason: HOSPADM

## 2024-12-04 RX ORDER — ACETAMINOPHEN 325 MG/1
650 TABLET ORAL
Status: DISCONTINUED | OUTPATIENT
Start: 2024-12-04 | End: 2024-12-04 | Stop reason: HOSPADM

## 2024-12-04 RX ORDER — FAMOTIDINE 10 MG/ML
20 INJECTION, SOLUTION INTRAVENOUS
Status: DISCONTINUED | OUTPATIENT
Start: 2024-12-04 | End: 2024-12-04 | Stop reason: HOSPADM

## 2024-12-04 RX ORDER — ALBUTEROL SULFATE 90 UG/1
4 INHALANT RESPIRATORY (INHALATION) PRN
Status: DISCONTINUED | OUTPATIENT
Start: 2024-12-04 | End: 2024-12-04 | Stop reason: HOSPADM

## 2024-12-04 RX ORDER — ONDANSETRON 2 MG/ML
8 INJECTION INTRAMUSCULAR; INTRAVENOUS
Status: DISCONTINUED | OUTPATIENT
Start: 2024-12-04 | End: 2024-12-04 | Stop reason: HOSPADM

## 2024-12-04 RX ORDER — DIPHENHYDRAMINE HYDROCHLORIDE 50 MG/ML
50 INJECTION INTRAMUSCULAR; INTRAVENOUS
Status: DISCONTINUED | OUTPATIENT
Start: 2024-12-04 | End: 2024-12-04 | Stop reason: HOSPADM

## 2024-12-04 NOTE — PROGRESS NOTES
[facility] RHEUMATOLOGY  [address] [place] [address], [address]  [place] [address]  Office : [phone], Fax: [phone]       Rituxan infusion today. Course #7, treatment # 1. Patient's diagnosis is Rheumatoid Arthritis.     *Pt medication supplied by [facility]    Rituxan 1000 mg infused in 250 ml NaCl. Patient monitored closely during infusion.   Premedicated with Tylenol 1000 mg PO, Antihistamine PO and Soul Medrol 100 mg given by IV push 30 minutes prior to the start of the Rituxan infusion.     IV location: [facility], Inserted by Spring Yarbrough RN  IV insertion time: 1044  Solu Medrol push given at 1050 over 2 minutes  Medication start time: 1130  Medication completion time: 1500    Patient discharged feeling fine and instructed to call the office with any post-infusion issues.     Pre-Infusion Questionnaire  Has your insurance changed or have you received a new card since your last visit?  no  Have you had any infections since your last infusion?   no  Since your last visit, have you been hospitalized, been to the ER, or Urgent Care Center for any reason?  yes, pt went to urgent care for an open wound 2 months ago  Have you recently had GI problems, open wounds, urinary problems, or chest pain?   yes, pt had open wound that is completely healed now  Are you currently taking antibiotics?   no  Have you recently had or are you scheduled for any surgical procedures?   no  Have you had a TB test in the last year?   yes, 6/10/2024 (indeterminate, chest x-ray negative)  Did you premedicate for today's infusion?  yes, tylenol and benadryl  Have you received any vaccinations in the last 6 months, or planning to receive in the next 3 months: COVID, Flu, Pheumonia, Shingles?  yes, pt had COVID & Flu vaccines in September  When was your last appointment with Dr. Boudreaux, Dr. Ly, or Dr. Castro or Dr. Zhang.   10/23/2024  When was your last infusion?        6/25/2024  12. Are you in skilled nursing facility,

## 2024-12-19 ENCOUNTER — NURSE ONLY (OUTPATIENT)
Dept: RHEUMATOLOGY | Age: 69
End: 2024-12-19

## 2024-12-19 VITALS
BODY MASS INDEX: 27.77 KG/M2 | TEMPERATURE: 98 F | WEIGHT: 142.2 LBS | HEART RATE: 91 BPM | SYSTOLIC BLOOD PRESSURE: 111 MMHG | DIASTOLIC BLOOD PRESSURE: 65 MMHG

## 2024-12-19 DIAGNOSIS — M05.9 SEROPOSITIVE RHEUMATOID ARTHRITIS (HCC): Primary | ICD-10-CM

## 2024-12-19 RX ORDER — SODIUM CHLORIDE 9 MG/ML
INJECTION, SOLUTION INTRAVENOUS CONTINUOUS
OUTPATIENT
Start: 2025-06-04

## 2024-12-19 RX ORDER — EPINEPHRINE 1 MG/ML
0.3 INJECTION, SOLUTION, CONCENTRATE INTRAVENOUS PRN
OUTPATIENT
Start: 2025-06-04

## 2024-12-19 RX ORDER — HYDROCORTISONE SODIUM SUCCINATE 100 MG/2ML
100 INJECTION INTRAMUSCULAR; INTRAVENOUS
OUTPATIENT
Start: 2025-06-04

## 2024-12-19 RX ORDER — DIPHENHYDRAMINE HYDROCHLORIDE 50 MG/ML
50 INJECTION INTRAMUSCULAR; INTRAVENOUS ONCE
OUTPATIENT
Start: 2025-06-04 | End: 2025-06-04

## 2024-12-19 RX ORDER — ACETAMINOPHEN 325 MG/1
650 TABLET ORAL
Status: DISCONTINUED | OUTPATIENT
Start: 2024-12-19 | End: 2024-12-19 | Stop reason: HOSPADM

## 2024-12-19 RX ORDER — ACETAMINOPHEN 325 MG/1
650 TABLET ORAL ONCE
OUTPATIENT
Start: 2025-06-04 | End: 2025-06-04

## 2024-12-19 RX ORDER — ACETAMINOPHEN 325 MG/1
650 TABLET ORAL
OUTPATIENT
Start: 2025-06-04

## 2024-12-19 RX ORDER — MEPERIDINE HYDROCHLORIDE 50 MG/ML
12.5 INJECTION INTRAMUSCULAR; INTRAVENOUS; SUBCUTANEOUS PRN
OUTPATIENT
Start: 2025-06-04

## 2024-12-19 RX ORDER — ONDANSETRON 2 MG/ML
8 INJECTION INTRAMUSCULAR; INTRAVENOUS
OUTPATIENT
Start: 2025-06-04

## 2024-12-19 RX ORDER — HYDROCORTISONE SODIUM SUCCINATE 100 MG/2ML
100 INJECTION INTRAMUSCULAR; INTRAVENOUS
Status: DISCONTINUED | OUTPATIENT
Start: 2024-12-19 | End: 2024-12-19 | Stop reason: HOSPADM

## 2024-12-19 RX ORDER — ALBUTEROL SULFATE 90 UG/1
4 INHALANT RESPIRATORY (INHALATION) PRN
Status: DISCONTINUED | OUTPATIENT
Start: 2024-12-19 | End: 2024-12-19 | Stop reason: HOSPADM

## 2024-12-19 RX ORDER — ACETAMINOPHEN 325 MG/1
650 TABLET ORAL ONCE
Status: DISCONTINUED | OUTPATIENT
Start: 2024-12-19 | End: 2024-12-19 | Stop reason: HOSPADM

## 2024-12-19 RX ORDER — DIPHENHYDRAMINE HYDROCHLORIDE 50 MG/ML
50 INJECTION INTRAMUSCULAR; INTRAVENOUS
OUTPATIENT
Start: 2025-06-04

## 2024-12-19 RX ORDER — DIPHENHYDRAMINE HYDROCHLORIDE 50 MG/ML
50 INJECTION INTRAMUSCULAR; INTRAVENOUS
Status: DISCONTINUED | OUTPATIENT
Start: 2024-12-19 | End: 2024-12-19 | Stop reason: HOSPADM

## 2024-12-19 RX ORDER — ALBUTEROL SULFATE 90 UG/1
4 INHALANT RESPIRATORY (INHALATION) PRN
OUTPATIENT
Start: 2025-06-04

## 2024-12-19 RX ORDER — FAMOTIDINE 10 MG/ML
20 INJECTION, SOLUTION INTRAVENOUS
OUTPATIENT
Start: 2025-06-04

## 2024-12-19 RX ORDER — DIPHENHYDRAMINE HYDROCHLORIDE 50 MG/ML
50 INJECTION INTRAMUSCULAR; INTRAVENOUS ONCE
Status: DISCONTINUED | OUTPATIENT
Start: 2024-12-19 | End: 2024-12-19 | Stop reason: HOSPADM

## 2024-12-19 RX ORDER — MEPERIDINE HYDROCHLORIDE 50 MG/ML
12.5 INJECTION INTRAMUSCULAR; INTRAVENOUS; SUBCUTANEOUS PRN
Status: DISCONTINUED | OUTPATIENT
Start: 2024-12-19 | End: 2024-12-19 | Stop reason: HOSPADM

## 2024-12-19 RX ORDER — ONDANSETRON 2 MG/ML
8 INJECTION INTRAMUSCULAR; INTRAVENOUS
Status: DISCONTINUED | OUTPATIENT
Start: 2024-12-19 | End: 2024-12-19 | Stop reason: HOSPADM

## 2024-12-19 RX ORDER — SODIUM CHLORIDE 9 MG/ML
INJECTION, SOLUTION INTRAVENOUS CONTINUOUS
Status: DISCONTINUED | OUTPATIENT
Start: 2024-12-19 | End: 2024-12-19 | Stop reason: HOSPADM

## 2024-12-19 RX ORDER — EPINEPHRINE 1 MG/ML
0.3 INJECTION, SOLUTION, CONCENTRATE INTRAVENOUS PRN
Status: DISCONTINUED | OUTPATIENT
Start: 2024-12-19 | End: 2024-12-19 | Stop reason: HOSPADM

## 2024-12-19 RX ORDER — FAMOTIDINE 10 MG/ML
20 INJECTION, SOLUTION INTRAVENOUS
Status: DISCONTINUED | OUTPATIENT
Start: 2024-12-19 | End: 2024-12-19 | Stop reason: HOSPADM

## 2024-12-19 NOTE — PROGRESS NOTES
infections since your last infusion?   no  Since your last visit, have you been hospitalized, been to the ER, or Urgent Care Center for any reason?  no  Have you recently had GI problems, open wounds, urinary problems, or chest pain?   yes, pt has an open wound that was assessed by Dr. Ly  Are you currently taking antibiotics?   no  Have you recently had or are you scheduled for any surgical procedures?   no  Have you had a TB test in the last year?   yes, 6/10/2024 (indeterminate but chest x-ray was negative)  Did you premedicate for today's infusion?  yes, pt took tylenol and one benadryl PO  Have you received any vaccinations in the last 6 months, or planning to receive in the next 3 months: COVID, Flu, Pheumonia, Shingles?  yes, in September 2024 pt received COVID & Flu vaccines  When was your last appointment with Dr. Boudreaux, Dr. Ly, or Dr. Castro or Dr. Zhang.   10/23/2024  When was your last infusion?        12/4/2024  12. Are you in skilled nursing facility, Rehab, or living at a nursing home?      no    Reviewed and Confirmed by Che Moore

## 2025-01-14 LAB — TSH SERPL DL<=0.005 MIU/L-ACNC: 1.51 UIU/ML (ref 0.45–4.5)

## 2025-01-17 ENCOUNTER — OFFICE VISIT (OUTPATIENT)
Dept: ENDOCRINOLOGY | Age: 70
End: 2025-01-17
Payer: MEDICARE

## 2025-01-17 VITALS
DIASTOLIC BLOOD PRESSURE: 78 MMHG | WEIGHT: 143 LBS | SYSTOLIC BLOOD PRESSURE: 132 MMHG | BODY MASS INDEX: 28.07 KG/M2 | RESPIRATION RATE: 18 BRPM | OXYGEN SATURATION: 98 % | HEIGHT: 60 IN | HEART RATE: 94 BPM

## 2025-01-17 DIAGNOSIS — E03.9 PRIMARY HYPOTHYROIDISM: Primary | ICD-10-CM

## 2025-01-17 PROCEDURE — 99213 OFFICE O/P EST LOW 20 MIN: CPT | Performed by: INTERNAL MEDICINE

## 2025-01-17 PROCEDURE — 1159F MED LIST DOCD IN RCRD: CPT | Performed by: INTERNAL MEDICINE

## 2025-01-17 PROCEDURE — 1123F ACP DISCUSS/DSCN MKR DOCD: CPT | Performed by: INTERNAL MEDICINE

## 2025-01-17 RX ORDER — LEVOTHYROXINE SODIUM 88 UG/1
TABLET ORAL
Qty: 100 TABLET | Refills: 3 | Status: SHIPPED | OUTPATIENT
Start: 2025-01-17

## 2025-01-17 RX ORDER — BENAZEPRIL HYDROCHLORIDE AND HYDROCHLOROTHIAZIDE 20; 12.5 MG/1; MG/1
1 TABLET ORAL DAILY
COMMUNITY

## 2025-01-17 ASSESSMENT — ENCOUNTER SYMPTOMS
CONSTIPATION: 0
DIARRHEA: 0

## 2025-01-17 NOTE — PROGRESS NOTES
Hipolito Umaña MD, FACE  [facility] Endocrinology [facility]  [address] [place] [address], [facility] [address]  [place] [address]  Phone [phone]  Facsimile [phone]          Che Moore is a 69 y.o. female seen 1/17/2025 for follow up of hypothyroidism        ASSESSMENT AND PLAN:    1. Primary hypothyroidism  She has a longstanding history of hypothyroidism.  Based on her positive family history of thyroid disease and concurrent history of rheumatoid arthritis, I suspect she likely has Hashimoto's thyroiditis.  She is biochemically euthyroid.  Follow up in 6 months.    - levothyroxine (SYNTHROID) 88 MCG tablet; 1 tablet once a day with an extra tablet on Sundays  Dispense: 100 tablet; Refill: 3      Follow-up and Dispositions    Return in about 6 months (around 7/17/2025).         HISTORY OF PRESENT ILLNESS:    THYROID DISEASE     Presentation/Diagnosis: Hypothyroidism diagnosed in 1990 after the birth of her child.     Symptoms: See review of systems.      Treatment: Takes generic in AM correctly.       Imaging: None.     Labs:  3/13/2020: TSH 0.210.  9/16/2020: TSH 0.035, free T4 1.21.  2/25/2021: TSH 0.185, free T4 1.35.  1/7/2022: TSH 0.013, free T4 1.70.  2/21/2022: TSH 0.097, free T4 1.50.  4/20/2022: TSH 0.805, free T4 1.23, 25-OH vitamin D 45.9.  7/19/2022: TSH 0.239, free T4 1.55.  10/25/2022: TSH 0.643, free T4 1.49.  1/26/2023: TSH 1.140.  8/7/2023: TSH 1.030.  3/12/2024: TSH 2.710.  4/26/2024: TSH 6.230, free T4 1.43.  7/24/2024: TSH 0.344, free T4 1.75.  1/13/2025: TSH 1.510.      Review of Systems   Constitutional:  Positive for diaphoresis (in the afternoons since she started Cymbalta) and fatigue (improving).        Weight decreased 4 pounds since last visit.   Cardiovascular:  Positive for palpitations.   Gastrointestinal:  Negative for constipation and diarrhea.   Endocrine: Negative for cold intolerance and heat intolerance.   Musculoskeletal:         Her RA is only well

## 2025-02-12 DIAGNOSIS — M54.16 RADICULOPATHY, LUMBAR REGION: ICD-10-CM

## 2025-02-12 DIAGNOSIS — R26.9 GAIT DIFFICULTY: ICD-10-CM

## 2025-02-12 DIAGNOSIS — R74.8 ABNORMAL LIVER ENZYMES: ICD-10-CM

## 2025-02-12 DIAGNOSIS — R79.9 ABNORMAL BLOOD FINDING: ICD-10-CM

## 2025-02-12 DIAGNOSIS — Z79.52 LONG TERM (CURRENT) USE OF SYSTEMIC STEROIDS: ICD-10-CM

## 2025-02-12 DIAGNOSIS — E55.9 HYPOVITAMINOSIS D: ICD-10-CM

## 2025-02-12 DIAGNOSIS — M05.9 SEROPOSITIVE RHEUMATOID ARTHRITIS (HCC): ICD-10-CM

## 2025-02-12 DIAGNOSIS — R53.83 OTHER FATIGUE: ICD-10-CM

## 2025-02-12 DIAGNOSIS — Z79.899 LONG TERM CURRENT USE OF IMMUNOSUPPRESSIVE DRUG: ICD-10-CM

## 2025-02-12 DIAGNOSIS — Z79.899 HIGH RISK MEDICATION USE: ICD-10-CM

## 2025-02-12 DIAGNOSIS — R29.6 FALLS: ICD-10-CM

## 2025-02-12 DIAGNOSIS — M15.9 GENERALIZED OSTEOARTHRITIS: ICD-10-CM

## 2025-02-12 RX ORDER — GABAPENTIN 300 MG/1
CAPSULE ORAL
Qty: 270 CAPSULE | Refills: 1 | Status: SHIPPED | OUTPATIENT
Start: 2025-02-12 | End: 2026-01-26

## 2025-02-12 NOTE — TELEPHONE ENCOUNTER
Pt called and needs a refill on Gabapentin    LOV:10/23/24  NOV: 2/26/2024    Component      Latest Ref Rng 10/18/2024   WBC      3.4 - 10.8 x10E3/uL 9.5    RBC      3.77 - 5.28 x10E6/uL 3.85    Hemoglobin Quant      11.1 - 15.9 g/dL 13.0    Hematocrit      34.0 - 46.6 % 38.7    MCV      79 - 97 fL 101 (H)    MCH      26.6 - 33.0 pg 33.8 (H)    MCHC      31.5 - 35.7 g/dL 33.6    RDW      11.7 - 15.4 % 11.9    Platelet Count      150 - 450 x10E3/uL 219    Neutrophils %      Not Estab. % 62    Lymphocyte %      Not Estab. % 28    Monocytes %      Not Estab. % 6    Eosinophils %      Not Estab. % 2    Basophils %      Not Estab. % 1    Neutrophils Absolute      1.4 - 7.0 x10E3/uL 5.9    Lymphocytes Absolute      0.7 - 3.1 x10E3/uL 2.7    Monocytes Absolute      0.1 - 0.9 x10E3/uL 0.6    Eosinophils Absolute      0.0 - 0.4 x10E3/uL 0.2    Basophils Absolute      0.0 - 0.2 x10E3/uL 0.1    Immature Granulocytes %      Not Estab. % 1    Immature Grans (Abs)      0.0 - 0.1 x10E3/uL 0.1    Glucose      70 - 99 mg/dL 111 (H)    BUN,BUNPL      8 - 27 mg/dL 26    Creatinine      0.57 - 1.00 mg/dL 1.19 (H)    Est, Glom Filt Rate      >59 mL/min/1.73 49 (L)    Bun/Cre      12 - 28  22    Sodium      134 - 144 mmol/L 142    Potassium      3.5 - 5.2 mmol/L 3.8    Chloride      96 - 106 mmol/L 102    CARBON DIOXIDE      20 - 29 mmol/L 24    Calcium      8.7 - 10.3 mg/dL 9.5    Total Protein      6.0 - 8.5 g/dL 6.7    Albumin      3.8 - 4.8 g/dL 4.7    Globulin, Total      1.5 - 4.5 g/dL 2.0    Total Bilirubin      0.0 - 1.2 mg/dL 0.6    Alkaline Phosphatase      44 - 121 IU/L 141 (H)    AST      0 - 40 IU/L 41 (H)    ALT      0 - 32 IU/L 36 (H)    Vit D, 25-Hydroxy      30.0 - 100.0 ng/mL 58.1       Legend:  (H) High  (L) Low

## 2025-02-22 LAB
25(OH)D3+25(OH)D2 SERPL-MCNC: 67.2 NG/ML (ref 30–100)
ALBUMIN SERPL-MCNC: 4.4 G/DL (ref 3.8–4.8)
ALP SERPL-CCNC: 144 IU/L (ref 44–121)
ALT SERPL-CCNC: 53 IU/L (ref 0–32)
AST SERPL-CCNC: 51 IU/L (ref 0–40)
BASOPHILS # BLD AUTO: 0.1 X10E3/UL (ref 0–0.2)
BASOPHILS NFR BLD AUTO: 1 %
BILIRUB SERPL-MCNC: 0.6 MG/DL (ref 0–1.2)
BUN SERPL-MCNC: 26 MG/DL (ref 8–27)
BUN/CREAT SERPL: 27 (ref 12–28)
CALCIUM SERPL-MCNC: 9.3 MG/DL (ref 8.7–10.3)
CHLORIDE SERPL-SCNC: 102 MMOL/L (ref 96–106)
CO2 SERPL-SCNC: 26 MMOL/L (ref 20–29)
CREAT SERPL-MCNC: 0.95 MG/DL (ref 0.57–1)
EGFRCR SERPLBLD CKD-EPI 2021: 64 ML/MIN/1.73
EOSINOPHIL # BLD AUTO: 0.2 X10E3/UL (ref 0–0.4)
EOSINOPHIL NFR BLD AUTO: 2 %
ERYTHROCYTE [DISTWIDTH] IN BLOOD BY AUTOMATED COUNT: 11.1 % (ref 11.7–15.4)
GLOBULIN SER CALC-MCNC: 2.1 G/DL (ref 1.5–4.5)
GLUCOSE SERPL-MCNC: 105 MG/DL (ref 70–99)
HCT VFR BLD AUTO: 39.8 % (ref 34–46.6)
HGB BLD-MCNC: 13.2 G/DL (ref 11.1–15.9)
IMM GRANULOCYTES # BLD AUTO: 0 X10E3/UL (ref 0–0.1)
IMM GRANULOCYTES NFR BLD AUTO: 0 %
LYMPHOCYTES # BLD AUTO: 2 X10E3/UL (ref 0.7–3.1)
LYMPHOCYTES NFR BLD AUTO: 22 %
MCH RBC QN AUTO: 33.5 PG (ref 26.6–33)
MCHC RBC AUTO-ENTMCNC: 33.2 G/DL (ref 31.5–35.7)
MCV RBC AUTO: 101 FL (ref 79–97)
MONOCYTES # BLD AUTO: 0.6 X10E3/UL (ref 0.1–0.9)
MONOCYTES NFR BLD AUTO: 7 %
NEUTROPHILS # BLD AUTO: 6.2 X10E3/UL (ref 1.4–7)
NEUTROPHILS NFR BLD AUTO: 68 %
PLATELET # BLD AUTO: 209 X10E3/UL (ref 150–450)
POTASSIUM SERPL-SCNC: 3.9 MMOL/L (ref 3.5–5.2)
PROT SERPL-MCNC: 6.5 G/DL (ref 6–8.5)
RBC # BLD AUTO: 3.94 X10E6/UL (ref 3.77–5.28)
SODIUM SERPL-SCNC: 144 MMOL/L (ref 134–144)
SPECIMEN STATUS REPORT: NORMAL
WBC # BLD AUTO: 9.1 X10E3/UL (ref 3.4–10.8)

## 2025-03-26 NOTE — PROGRESS NOTES
Che Moore is a 70 y.o. female who was seen by synchronous (real-time) audio-video technology.    Consent:  She and/or her healthcare decision maker is aware that this patient-initiated Telehealth encounter is a billable service, with coverage as determined by her insurance carrier. She is aware that she may receive a bill and has provided verbal consent to proceed: Yes          Subjective:      HPI:      Permanent history    Mrs. Moore is a very pleasant 70-year-old  lady with past medical history significant for hypertension, hyperlipidemia, hypothyroidism, osteoporosis, osteoarthritis and rheumatoid arthritis who presents for evaluation.    Was diagnosed approximately 20 years ago in [place], and initially her diagnosis was PMR as she started with hip problems and trouble walking.  Was on prednisone for about a year and felt very well on it.  Moved to [place] approximately 9 to 10 years ago.  At that time his joints started getting worse and involving more the joint of the posterior muscles.  Seen by outside rheumatologist, who did work-up and diagnosed RA.  Was started on methotrexate and prednisone this was working well for a while.  However patient felt she needed to wean off due to fatty liver, therefore slowly came off all her meds.  Still having symptoms stiffness swelling, pain and redness in joints.  However trying to take many over-the-counter herbal remedies if possible.  At one time she may have restarted methotrexate, but discontinued shortly thereafter because of worsening liver issues.  Since then has been maintained on long-term diclofenac.  Occasionally takes this with ibuprofen also.  Despite this symptoms significantly worsening in the past 18 months, now All her joint hurt. Pain is continuing to get worse. Dx with melanoma in neck, removed - doesn't go to onc - no treatments.  Right neck symptoms are severe, she cant get down on her knees.  Worse first in the

## 2025-03-27 ENCOUNTER — TELEMEDICINE (OUTPATIENT)
Dept: RHEUMATOLOGY | Age: 70
End: 2025-03-27

## 2025-03-27 DIAGNOSIS — M15.9 GENERALIZED OSTEOARTHRITIS: ICD-10-CM

## 2025-03-27 DIAGNOSIS — M05.9 SEROPOSITIVE RHEUMATOID ARTHRITIS (HCC): Primary | ICD-10-CM

## 2025-03-27 DIAGNOSIS — Z79.60 LONG TERM CURRENT USE OF IMMUNOSUPPRESSIVE DRUG: ICD-10-CM

## 2025-03-27 DIAGNOSIS — G89.29 OTHER CHRONIC PAIN: ICD-10-CM

## 2025-03-27 DIAGNOSIS — R76.8 RHEUMATOID FACTOR POSITIVE: ICD-10-CM

## 2025-03-27 DIAGNOSIS — M54.16 RADICULOPATHY, LUMBAR REGION: ICD-10-CM

## 2025-03-27 DIAGNOSIS — E55.9 HYPOVITAMINOSIS D: ICD-10-CM

## 2025-03-27 DIAGNOSIS — Z13.820 SCREENING FOR OSTEOPOROSIS: ICD-10-CM

## 2025-03-27 DIAGNOSIS — Z78.0 POST-MENOPAUSAL: ICD-10-CM

## 2025-03-27 DIAGNOSIS — R53.83 OTHER FATIGUE: ICD-10-CM

## 2025-03-27 DIAGNOSIS — R29.6 FALLS: ICD-10-CM

## 2025-03-27 DIAGNOSIS — R79.9 ABNORMAL BLOOD FINDING: ICD-10-CM

## 2025-03-27 DIAGNOSIS — M06.4 INFLAMMATORY POLYARTHRITIS (HCC): ICD-10-CM

## 2025-03-27 DIAGNOSIS — R74.8 ABNORMAL LIVER ENZYMES: ICD-10-CM

## 2025-03-27 DIAGNOSIS — Z79.899 HIGH RISK MEDICATION USE: ICD-10-CM

## 2025-03-27 DIAGNOSIS — Z79.52 LONG TERM (CURRENT) USE OF SYSTEMIC STEROIDS: ICD-10-CM

## 2025-03-27 DIAGNOSIS — R89.4 SEROLOGIC ABNORMALITY: ICD-10-CM

## 2025-03-27 DIAGNOSIS — R26.9 GAIT DIFFICULTY: ICD-10-CM

## 2025-03-27 NOTE — PATIENT INSTRUCTIONS
1. Labs -CBC, CMP, vitamin D, TPMT level before next visit  2.   DEXA scan before next visit  3.  Continue cymbalta  60 mg daily    4.   Continue current moisturizing/steroid cream for psoriasis if not better in the next month call us  5. Prednisone 5 once daily as needed  6. continue gabapentin 600 mg once daily at 6 to 7 PM after about 2 weeks can decrease to 300 mg once daily  Can take 600 to 900 mg once daily at 7 PM as needed  7.  Continue vitamin D 5000 units daily  8.  Reading material on Imuran  9.  Return to clinic in 2-3 months  with labs    3[facility],L[facility] [place]/[facility] prior mail,Dexa [place] prior mail/fax

## 2025-04-04 RX ORDER — DULOXETIN HYDROCHLORIDE 60 MG/1
60 CAPSULE, DELAYED RELEASE ORAL DAILY
Qty: 90 CAPSULE | Refills: 0 | Status: SHIPPED | OUTPATIENT
Start: 2025-04-04

## 2025-04-04 RX ORDER — GABAPENTIN 300 MG/1
CAPSULE ORAL
Qty: 90 CAPSULE | Refills: 0 | Status: SHIPPED | OUTPATIENT
Start: 2025-04-04 | End: 2026-03-10

## 2025-04-04 RX ORDER — PREDNISONE 5 MG/1
TABLET ORAL
Qty: 100 TABLET | Refills: 1 | Status: SHIPPED | OUTPATIENT
Start: 2025-04-04

## 2025-04-22 LAB
REF LAB TEST METHOD: NORMAL
TPMT GENE PROD MET ACT IMP BLD/T-IMP: NORMAL
TPMT RBC-CCNC: 28.7 UNITS/ML RBC

## 2025-05-15 ENCOUNTER — TELEPHONE (OUTPATIENT)
Dept: RHEUMATOLOGY | Age: 70
End: 2025-05-15

## 2025-05-15 DIAGNOSIS — M06.4 INFLAMMATORY POLYARTHRITIS (HCC): ICD-10-CM

## 2025-05-15 DIAGNOSIS — R53.83 OTHER FATIGUE: ICD-10-CM

## 2025-05-15 DIAGNOSIS — R89.4 SEROLOGIC ABNORMALITY: ICD-10-CM

## 2025-05-15 DIAGNOSIS — Z79.899 HIGH RISK MEDICATION USE: ICD-10-CM

## 2025-05-15 DIAGNOSIS — Z79.52 LONG TERM (CURRENT) USE OF SYSTEMIC STEROIDS: ICD-10-CM

## 2025-05-15 DIAGNOSIS — Z79.60 LONG TERM CURRENT USE OF IMMUNOSUPPRESSIVE DRUG: ICD-10-CM

## 2025-05-15 DIAGNOSIS — M05.9 SEROPOSITIVE RHEUMATOID ARTHRITIS (HCC): Primary | ICD-10-CM

## 2025-05-15 DIAGNOSIS — G89.29 OTHER CHRONIC PAIN: ICD-10-CM

## 2025-05-15 NOTE — TELEPHONE ENCOUNTER
VV 3/27/25,  Appt 6/17, Tt gets Rituxan Q 6 months, last was 12/4 and 12/19/24, last CBC and CMP 3/25/25 and last TB 6/10/24,see pt message below      Would it be possible to get scheduled for my infusion next week?  I’m having quite a bit of discomfort especially in my left hip and knee. [place] go [place] 2 [place] on June 5th and I’d like to get them both done before then. Thanks.   Che lyles abnormal data CBC with Auto Differential-POC  Order: [id number]  Component  Ref Range & Units 3/25/25 0849   WBC  3.5 - 10.8 K/uL 13.4 High    RBC  3.86 - 5.35 M/uL 4.11   Hemoglobin  11.0 - 15.4 g/dL 13.5   Hematocrit  35.6 - 47.3 % 40.5   MCV  79.0 - 100.0 fL 98.6   MCH  23.7 - 32.9 pg 32.9   MCHC  30.0 - 36.5 g/dL 33.4   RDW-CV  11.6 - 16.0 % 12.8   Platelets  150 - 400 K/uL 377   MPV  6.9 - 10.6 fL 7.3   Granulocytes, %    Lymphocytes, %  % 21.8   Monocytes, %  % 6.7   Lymphocytes, Abs  1.18 - 3.74 K/uL 2.91   Monocytes, Abs  0.24 - 0.86 K/uL 0.9 High    Granulocytes Absolute Count    Neutrophils, %  % 69.5   Neutrophils, Abs  1.56 - 6.13 K/uL 9.3 High    Basophils %  % 0.6   Comment: Test performed on the [employer] Karmen [id number] Analyzer. Refer to ’s limitations for basophil count and percent.   Basophils, Abs  0.01 - 0.08 K/uL 0.08   Comment: Test performed on the [employer] Karmen [id number] Analyzer. Refer to ’s limitations for basophil count and percent.   Eosinophils %  % 1.4   Eosinophils, Abs  0.04 - 0.36 K/uL 0.19   Resulting Agency [facility]     Specimen Collected: 03/25/25 08:49    Performed by: [facility]          ntains abnormal data Comprehensive Metabolic Panel (CMP)  Order: [id number]  Component  Ref Range & Units 3/25/25 0849   Sodium  136 - 145 mmol/L 144   Potassium  3.5 - 5.1 mmol/L 3.5   Chloride  98 - 107 mmol/L 103   CO2  22 - 29 mmol/L 25   Anion Gap  6 - 16 mmol/L 16   BUN  8 - 23 mg/dL 16   Creatinine  0.51 - 0.95 mg/dL 0.91

## 2025-05-21 NOTE — TELEPHONE ENCOUNTER
Awaiting Pt Reply    Hey, Per Dr Ly, we can do the Rituxan early but she said she needs you to do your TB test first, that does take a few days or more to result, I included the order here,if you ca print it out? However we wont be able to get the whole course done by the time you go,when are you returning? Maybe you can do the 1st dose before going? Et me know your thoughts? And have you had your Bone Density test done at [place]? Thanks

## 2025-05-22 ENCOUNTER — TELEPHONE (OUTPATIENT)
Dept: RHEUMATOLOGY | Age: 70
End: 2025-05-22

## 2025-05-22 NOTE — TELEPHONE ENCOUNTER
----- Message from Tracy VERONICA sent at 5/21/2025  2:21 PM EDT -----  Regarding: Rituxan  Pt needing Rituxan, may be done a little early per Dr Ly but really may only get to do 1st dose before going [place] if pt chooses to or now may want to wait?, awaiting pt reply, she has to have TB test done first, thanks

## 2025-05-22 NOTE — TELEPHONE ENCOUNTER
Rituxan ordered by Dr. Ly. Last infusion was 12/19/25. Patient's medication provided free of charge from [facility]. PHONE CALL to [employer] to order her medication. Spoke with Taz.   Account is on hold by the [facility]. He spoke with [employer]. Account has been reactivated and re-entered into the system. May take a couple of days to process. Advised I call back next Tuesday.

## 2025-05-26 LAB
GAMMA INTERFERON BACKGROUND BLD IA-ACNC: 0.02 IU/ML
M TB IFN-G BLD-IMP: NEGATIVE
M TB IFN-G CD4+ BCKGRND COR BLD-ACNC: 0.05 IU/ML
M TB IFN-G CD4+CD8+ BCKGRND COR BLD-ACNC: 0.04 IU/ML
MITOGEN IGNF BCKGRD COR BLD-ACNC: 6.33 IU/ML
QUANTIFERON, INCUBATION: NORMAL
SERVICE CMNT-IMP: NORMAL

## 2025-05-27 NOTE — TELEPHONE ENCOUNTER
TB test Negative,see below and will do Rituxan #1 6/4 at 930am    Component      Latest Ref Rng 5/22/2025   Quantiferon Criteria Comment    QuantiFERON TB1 Ag Value      IU/mL 0.05    QuantiFERON TB2 Ag Value      IU/mL 0.04    QuantiFERON Nil Value      IU/mL 0.02    QuantiFERON Mitogen Value      IU/mL 6.33    Quantiferon, Incubation Incubation performed.    Quantiferon TB Gold Plus      Negative  Negative

## 2025-05-27 NOTE — TELEPHONE ENCOUNTER
PHONE CALL to [employer] to schedule delivery of Rituxan to the office.   He can schedule delivery for 6/4/25. Order # [id number].

## 2025-06-04 ENCOUNTER — CLINICAL SUPPORT (OUTPATIENT)
Dept: RHEUMATOLOGY | Age: 70
End: 2025-06-04
Payer: MEDICARE

## 2025-06-04 VITALS
HEART RATE: 73 BPM | TEMPERATURE: 97.3 F | SYSTOLIC BLOOD PRESSURE: 153 MMHG | BODY MASS INDEX: 28.04 KG/M2 | DIASTOLIC BLOOD PRESSURE: 92 MMHG | WEIGHT: 143.6 LBS

## 2025-06-04 DIAGNOSIS — M05.9 SEROPOSITIVE RHEUMATOID ARTHRITIS (HCC): Primary | ICD-10-CM

## 2025-06-04 LAB
25(OH)D3 SERPL-MCNC: 94.6 NG/ML (ref 30–100)
ALBUMIN SERPL-MCNC: 3.9 G/DL (ref 3.2–4.6)
ALBUMIN/GLOB SERPL: 1.3 (ref 1–1.9)
ALP SERPL-CCNC: 113 U/L (ref 35–104)
ALT SERPL-CCNC: 37 U/L (ref 8–45)
ANION GAP SERPL CALC-SCNC: 17 MMOL/L (ref 7–16)
AST SERPL-CCNC: 41 U/L (ref 15–37)
BILIRUB SERPL-MCNC: 0.3 MG/DL (ref 0–1.2)
BUN SERPL-MCNC: 26 MG/DL (ref 8–23)
CALCIUM SERPL-MCNC: 9.4 MG/DL (ref 8.8–10.2)
CHLORIDE SERPL-SCNC: 103 MMOL/L (ref 98–107)
CO2 SERPL-SCNC: 23 MMOL/L (ref 20–29)
CREAT SERPL-MCNC: 0.95 MG/DL (ref 0.6–1.1)
ERYTHROCYTE [DISTWIDTH] IN BLOOD BY AUTOMATED COUNT: 12.8 % (ref 11.9–14.6)
GLOBULIN SER CALC-MCNC: 3.1 G/DL (ref 2.3–3.5)
GLUCOSE SERPL-MCNC: 67 MG/DL (ref 70–99)
HCT VFR BLD AUTO: 37.4 % (ref 35.8–46.3)
HGB BLD-MCNC: 13.1 G/DL (ref 11.7–15.4)
MCH RBC QN AUTO: 33.5 PG (ref 26.1–32.9)
MCHC RBC AUTO-ENTMCNC: 35 G/DL (ref 31.4–35)
MCV RBC AUTO: 95.7 FL (ref 82–102)
NRBC # BLD: 0 K/UL (ref 0–0.2)
PLATELET # BLD AUTO: 196 K/UL (ref 150–450)
PMV BLD AUTO: 9.2 FL (ref 9.4–12.3)
POTASSIUM SERPL-SCNC: 4.1 MMOL/L (ref 3.5–5.1)
PROT SERPL-MCNC: 7.1 G/DL (ref 6.3–8.2)
RBC # BLD AUTO: 3.91 M/UL (ref 4.05–5.2)
SODIUM SERPL-SCNC: 143 MMOL/L (ref 136–145)
WBC # BLD AUTO: 5.9 K/UL (ref 4.3–11.1)

## 2025-06-04 PROCEDURE — 96413 CHEMO IV INFUSION 1 HR: CPT | Performed by: INTERNAL MEDICINE

## 2025-06-04 PROCEDURE — 96375 TX/PRO/DX INJ NEW DRUG ADDON: CPT | Performed by: INTERNAL MEDICINE

## 2025-06-04 PROCEDURE — 96415 CHEMO IV INFUSION ADDL HR: CPT | Performed by: INTERNAL MEDICINE

## 2025-06-04 RX ORDER — DIPHENHYDRAMINE HYDROCHLORIDE 50 MG/ML
50 INJECTION, SOLUTION INTRAMUSCULAR; INTRAVENOUS
OUTPATIENT
Start: 2025-06-18

## 2025-06-04 RX ORDER — ALBUTEROL SULFATE 90 UG/1
4 INHALANT RESPIRATORY (INHALATION) PRN
Status: DISCONTINUED | OUTPATIENT
Start: 2025-06-04 | End: 2025-06-04 | Stop reason: HOSPADM

## 2025-06-04 RX ORDER — DIPHENHYDRAMINE HYDROCHLORIDE 50 MG/ML
50 INJECTION, SOLUTION INTRAMUSCULAR; INTRAVENOUS ONCE
OUTPATIENT
Start: 2025-06-18 | End: 2025-06-18

## 2025-06-04 RX ORDER — ACETAMINOPHEN 325 MG/1
650 TABLET ORAL ONCE
Status: DISCONTINUED | OUTPATIENT
Start: 2025-06-04 | End: 2025-06-04 | Stop reason: HOSPADM

## 2025-06-04 RX ORDER — ONDANSETRON 2 MG/ML
8 INJECTION INTRAMUSCULAR; INTRAVENOUS
Status: DISCONTINUED | OUTPATIENT
Start: 2025-06-04 | End: 2025-06-04 | Stop reason: HOSPADM

## 2025-06-04 RX ORDER — FAMOTIDINE 10 MG/ML
20 INJECTION, SOLUTION INTRAVENOUS
Status: DISCONTINUED | OUTPATIENT
Start: 2025-06-04 | End: 2025-06-04 | Stop reason: HOSPADM

## 2025-06-04 RX ORDER — ACETAMINOPHEN 325 MG/1
650 TABLET ORAL ONCE
OUTPATIENT
Start: 2025-06-18 | End: 2025-06-18

## 2025-06-04 RX ORDER — ACETAMINOPHEN 325 MG/1
650 TABLET ORAL
Status: DISCONTINUED | OUTPATIENT
Start: 2025-06-04 | End: 2025-06-04 | Stop reason: HOSPADM

## 2025-06-04 RX ORDER — DIPHENHYDRAMINE HYDROCHLORIDE 50 MG/ML
50 INJECTION, SOLUTION INTRAMUSCULAR; INTRAVENOUS ONCE
Status: DISCONTINUED | OUTPATIENT
Start: 2025-06-04 | End: 2025-06-04 | Stop reason: HOSPADM

## 2025-06-04 RX ORDER — HYDROCORTISONE SODIUM SUCCINATE 100 MG/2ML
100 INJECTION INTRAMUSCULAR; INTRAVENOUS
OUTPATIENT
Start: 2025-06-18

## 2025-06-04 RX ORDER — MEPERIDINE HYDROCHLORIDE 50 MG/ML
12.5 INJECTION INTRAMUSCULAR; INTRAVENOUS; SUBCUTANEOUS PRN
OUTPATIENT
Start: 2025-06-18

## 2025-06-04 RX ORDER — SODIUM CHLORIDE 9 MG/ML
INJECTION, SOLUTION INTRAVENOUS CONTINUOUS
Status: DISCONTINUED | OUTPATIENT
Start: 2025-06-04 | End: 2025-06-04 | Stop reason: HOSPADM

## 2025-06-04 RX ORDER — DIPHENHYDRAMINE HYDROCHLORIDE 50 MG/ML
50 INJECTION, SOLUTION INTRAMUSCULAR; INTRAVENOUS
Status: DISCONTINUED | OUTPATIENT
Start: 2025-06-04 | End: 2025-06-04 | Stop reason: HOSPADM

## 2025-06-04 RX ORDER — SODIUM CHLORIDE 9 MG/ML
INJECTION, SOLUTION INTRAVENOUS CONTINUOUS
OUTPATIENT
Start: 2025-06-18

## 2025-06-04 RX ORDER — HYDROCORTISONE SODIUM SUCCINATE 100 MG/2ML
100 INJECTION INTRAMUSCULAR; INTRAVENOUS
Status: DISCONTINUED | OUTPATIENT
Start: 2025-06-04 | End: 2025-06-04 | Stop reason: HOSPADM

## 2025-06-04 RX ORDER — EPINEPHRINE 1 MG/ML
0.3 INJECTION, SOLUTION, CONCENTRATE INTRAVENOUS PRN
Status: DISCONTINUED | OUTPATIENT
Start: 2025-06-04 | End: 2025-06-04 | Stop reason: HOSPADM

## 2025-06-04 RX ORDER — EPINEPHRINE 1 MG/ML
0.3 INJECTION, SOLUTION, CONCENTRATE INTRAVENOUS PRN
OUTPATIENT
Start: 2025-06-18

## 2025-06-04 RX ORDER — FAMOTIDINE 10 MG/ML
20 INJECTION, SOLUTION INTRAVENOUS
OUTPATIENT
Start: 2025-06-18

## 2025-06-04 RX ORDER — ACETAMINOPHEN 325 MG/1
650 TABLET ORAL
OUTPATIENT
Start: 2025-06-18

## 2025-06-04 RX ORDER — MEPERIDINE HYDROCHLORIDE 50 MG/ML
12.5 INJECTION INTRAMUSCULAR; INTRAVENOUS; SUBCUTANEOUS PRN
Status: DISCONTINUED | OUTPATIENT
Start: 2025-06-04 | End: 2025-06-04 | Stop reason: HOSPADM

## 2025-06-04 RX ORDER — ALBUTEROL SULFATE 90 UG/1
4 INHALANT RESPIRATORY (INHALATION) PRN
OUTPATIENT
Start: 2025-06-18

## 2025-06-04 RX ORDER — ONDANSETRON 2 MG/ML
8 INJECTION INTRAMUSCULAR; INTRAVENOUS
OUTPATIENT
Start: 2025-06-18

## 2025-06-04 NOTE — PROGRESS NOTES
[facility] RHEUMATOLOGY  [address] [place] [address], [address]  [place] [address]  Office : [phone], Fax: [phone]       Rituxan infusion today. Course #8, treatment #1. Patient's diagnosis is RA.   Rituxan 1000 mg infused in 250 ml NaCl. Patient monitored closely during infusion.   Premedicated with Tylenol 1000 mg PO, Antihistamine PO and Soul Medrol 100 mg given by IV push 30 minutes prior to the start of the Rituxan infusion.     IV location: [facility], Inserted by Kandace iMchaud RN  IV insertion time: 0936  Solu Medrol push given at 0940 over 2 minutes  Medication start time: 1010  Medication completion time: 1340    Patient Medication Supplied? yes, pt medication supplied by [facility]    Patient discharged feeling well and instructed to call the office with any post-infusion issues.     Pre-Infusion Questionnaire  Has your insurance changed or have you received a new card since your last visit?  no  Have you had any infections since your last infusion?   yes, pt had pneumonia & sepsis in the middle of March 2025.  Since your last visit, have you been hospitalized, been to the ER, or Urgent Care Center for any reason?  yes, pt was hospitalized for two days with pneumonia & sepsis and was then sent home. Pt denies any other problems/side effects following this hospitalization.   Have you recently had GI problems, open wounds, urinary problems, or chest pain?   no  Are you currently taking antibiotics?   no  Have you recently had or are you scheduled for any surgical procedures?   no  Have you had a TB test in the last year?   yes, 5/22/2025 (negative)  Did you premedicate for today's infusion?  yes, pt premedicated with tylenol & benadryl   Have you received any vaccinations in the last 6 months, or planning to receive in the next 3 months: COVID, Flu, Pheumonia, Shingles?  no  When was your last appointment with Dr. Boudreaux, Dr. Ly, or Dr. Castro?  3/27/2025  When was your last infusion?

## 2025-06-16 NOTE — PROGRESS NOTES
mild erosive changes on the right MCPs consistent with rheumatoid arthritis.    Clinical picture suggestive of seropositive rheumatoid arthritis, was poorly controlled currently.  Extensive comorbidities including history of melanoma, fatty liver disease therefore limited in our options.discussed options and changed to rituximab .  Schedule is approximately every 6 months, patient tolerating except when infusions are delayed.  Currently in middle of her cycle, second dose due today.  Some worsening of psoriasis possibly related to high-dose steroids given with rituximab therefore with second infusion today will defer steroids.  Furthermore last visit we discussed possibly adding low-dose azathioprine, TPMT level is normal as are her LFTs therefore recommend starting 50 mg daily as below.    Recently LFTs increased for which she is now seeing GI, diagnosis likely fatty liver disease, and she is working on diet and exercise.  Labs continue to improve.    Low back pain with radicular symptoms -continue gabapentin and Cymbalta     Osteoporosis screening, DEXA scan January 2022 shows FRAX calculation (using patients risk factors ) of 10 yr risk of major osteoporotic and hip fracture is 9.2% and 1% respectively and Tx warranted if >20% and 3 % respectively thus continue vitamin D supplementation.  Repeat DEXA at next visit              Treatment plan was discussed in detail with patient. Agreement and understanding of the plan was verbalized by the patient.   Total visit time   of 44 minutes, greater than half of the time was spent on counseling.        Plan:       1. Labs -CBC, CMP, vitamin D  2.   Start Imuran 50 mg once daily  3.  Continue cymbalta  60 mg daily    4.  Proceed with second dose [2 of 2] of rituximab 1000 mg IV -but no steroids with infusion today.  5. Prednisone 5 once daily as needed  6. continue gabapentin but please try to take 600 mg once daily at 6 to 7 PM for about 1 to 2 weeks and then can

## 2025-06-18 ENCOUNTER — OFFICE VISIT (OUTPATIENT)
Dept: RHEUMATOLOGY | Age: 70
End: 2025-06-18
Payer: MEDICARE

## 2025-06-18 ENCOUNTER — CLINICAL SUPPORT (OUTPATIENT)
Dept: RHEUMATOLOGY | Age: 70
End: 2025-06-18
Payer: MEDICARE

## 2025-06-18 VITALS
WEIGHT: 145 LBS | DIASTOLIC BLOOD PRESSURE: 98 MMHG | BODY MASS INDEX: 28.32 KG/M2 | TEMPERATURE: 97.1 F | SYSTOLIC BLOOD PRESSURE: 160 MMHG | HEART RATE: 85 BPM

## 2025-06-18 VITALS
SYSTOLIC BLOOD PRESSURE: 160 MMHG | DIASTOLIC BLOOD PRESSURE: 98 MMHG | HEIGHT: 60 IN | BODY MASS INDEX: 28.47 KG/M2 | WEIGHT: 145 LBS | HEART RATE: 85 BPM

## 2025-06-18 DIAGNOSIS — R53.83 OTHER FATIGUE: ICD-10-CM

## 2025-06-18 DIAGNOSIS — R74.8 ABNORMAL LIVER ENZYMES: ICD-10-CM

## 2025-06-18 DIAGNOSIS — Z79.52 LONG TERM (CURRENT) USE OF SYSTEMIC STEROIDS: ICD-10-CM

## 2025-06-18 DIAGNOSIS — Z79.899 HIGH RISK MEDICATION USE: ICD-10-CM

## 2025-06-18 DIAGNOSIS — E55.9 HYPOVITAMINOSIS D: ICD-10-CM

## 2025-06-18 DIAGNOSIS — M15.9 GENERALIZED OSTEOARTHRITIS: ICD-10-CM

## 2025-06-18 DIAGNOSIS — M05.9 SEROPOSITIVE RHEUMATOID ARTHRITIS (HCC): Primary | ICD-10-CM

## 2025-06-18 DIAGNOSIS — R89.4 SEROLOGIC ABNORMALITY: ICD-10-CM

## 2025-06-18 DIAGNOSIS — M54.16 RADICULOPATHY, LUMBAR REGION: ICD-10-CM

## 2025-06-18 DIAGNOSIS — M06.4 INFLAMMATORY POLYARTHRITIS (HCC): ICD-10-CM

## 2025-06-18 DIAGNOSIS — G89.29 OTHER CHRONIC PAIN: ICD-10-CM

## 2025-06-18 DIAGNOSIS — Z79.60 LONG TERM CURRENT USE OF IMMUNOSUPPRESSIVE DRUG: ICD-10-CM

## 2025-06-18 PROCEDURE — 96413 CHEMO IV INFUSION 1 HR: CPT | Performed by: INTERNAL MEDICINE

## 2025-06-18 PROCEDURE — 99215 OFFICE O/P EST HI 40 MIN: CPT | Performed by: INTERNAL MEDICINE

## 2025-06-18 PROCEDURE — 1125F AMNT PAIN NOTED PAIN PRSNT: CPT | Performed by: INTERNAL MEDICINE

## 2025-06-18 PROCEDURE — 96415 CHEMO IV INFUSION ADDL HR: CPT | Performed by: INTERNAL MEDICINE

## 2025-06-18 PROCEDURE — 1159F MED LIST DOCD IN RCRD: CPT | Performed by: INTERNAL MEDICINE

## 2025-06-18 PROCEDURE — 1123F ACP DISCUSS/DSCN MKR DOCD: CPT | Performed by: INTERNAL MEDICINE

## 2025-06-18 RX ORDER — MEPERIDINE HYDROCHLORIDE 50 MG/ML
12.5 INJECTION INTRAMUSCULAR; INTRAVENOUS; SUBCUTANEOUS PRN
OUTPATIENT
Start: 2025-12-03

## 2025-06-18 RX ORDER — AZATHIOPRINE 50 MG/1
50 TABLET ORAL DAILY
Qty: 90 TABLET | Refills: 0 | Status: SHIPPED | OUTPATIENT
Start: 2025-06-18

## 2025-06-18 RX ORDER — ACETAMINOPHEN 325 MG/1
650 TABLET ORAL
Status: CANCELLED | OUTPATIENT
Start: 2025-06-18

## 2025-06-18 RX ORDER — DIPHENHYDRAMINE HYDROCHLORIDE 50 MG/ML
50 INJECTION, SOLUTION INTRAMUSCULAR; INTRAVENOUS ONCE
Status: CANCELLED | OUTPATIENT
Start: 2025-06-18 | End: 2025-06-18

## 2025-06-18 RX ORDER — HYDROCORTISONE SODIUM SUCCINATE 100 MG/2ML
100 INJECTION INTRAMUSCULAR; INTRAVENOUS
OUTPATIENT
Start: 2025-12-03

## 2025-06-18 RX ORDER — ACETAMINOPHEN 325 MG/1
650 TABLET ORAL ONCE
OUTPATIENT
Start: 2025-12-03 | End: 2025-12-03

## 2025-06-18 RX ORDER — GABAPENTIN 300 MG/1
CAPSULE ORAL
Qty: 105 CAPSULE | Refills: 0 | Status: SHIPPED | OUTPATIENT
Start: 2025-06-18 | End: 2026-05-24

## 2025-06-18 RX ORDER — ONDANSETRON 2 MG/ML
8 INJECTION INTRAMUSCULAR; INTRAVENOUS
OUTPATIENT
Start: 2025-12-03

## 2025-06-18 RX ORDER — ONDANSETRON 2 MG/ML
8 INJECTION INTRAMUSCULAR; INTRAVENOUS
Status: DISCONTINUED | OUTPATIENT
Start: 2025-06-18 | End: 2025-06-18 | Stop reason: HOSPADM

## 2025-06-18 RX ORDER — MEPERIDINE HYDROCHLORIDE 50 MG/ML
12.5 INJECTION INTRAMUSCULAR; INTRAVENOUS; SUBCUTANEOUS PRN
Status: CANCELLED | OUTPATIENT
Start: 2025-06-18

## 2025-06-18 RX ORDER — FAMOTIDINE 10 MG/ML
20 INJECTION, SOLUTION INTRAVENOUS
OUTPATIENT
Start: 2025-12-03

## 2025-06-18 RX ORDER — DIPHENHYDRAMINE HYDROCHLORIDE 50 MG/ML
50 INJECTION, SOLUTION INTRAMUSCULAR; INTRAVENOUS ONCE
Status: DISCONTINUED | OUTPATIENT
Start: 2025-06-18 | End: 2025-06-18 | Stop reason: HOSPADM

## 2025-06-18 RX ORDER — FAMOTIDINE 10 MG/ML
20 INJECTION, SOLUTION INTRAVENOUS
Status: DISCONTINUED | OUTPATIENT
Start: 2025-06-18 | End: 2025-06-18 | Stop reason: HOSPADM

## 2025-06-18 RX ORDER — ACETAMINOPHEN 325 MG/1
650 TABLET ORAL
Status: DISCONTINUED | OUTPATIENT
Start: 2025-06-18 | End: 2025-06-18 | Stop reason: HOSPADM

## 2025-06-18 RX ORDER — DIPHENHYDRAMINE HYDROCHLORIDE 50 MG/ML
50 INJECTION, SOLUTION INTRAMUSCULAR; INTRAVENOUS
Status: CANCELLED | OUTPATIENT
Start: 2025-06-18

## 2025-06-18 RX ORDER — HYDROCORTISONE SODIUM SUCCINATE 100 MG/2ML
100 INJECTION INTRAMUSCULAR; INTRAVENOUS
Status: CANCELLED | OUTPATIENT
Start: 2025-06-18

## 2025-06-18 RX ORDER — DIPHENHYDRAMINE HYDROCHLORIDE 50 MG/ML
50 INJECTION, SOLUTION INTRAMUSCULAR; INTRAVENOUS
Status: DISCONTINUED | OUTPATIENT
Start: 2025-06-18 | End: 2025-06-18 | Stop reason: HOSPADM

## 2025-06-18 RX ORDER — ACETAMINOPHEN 325 MG/1
650 TABLET ORAL ONCE
Status: DISCONTINUED | OUTPATIENT
Start: 2025-06-18 | End: 2025-06-18 | Stop reason: HOSPADM

## 2025-06-18 RX ORDER — DULOXETIN HYDROCHLORIDE 60 MG/1
60 CAPSULE, DELAYED RELEASE ORAL DAILY
Qty: 90 CAPSULE | Refills: 0 | Status: SHIPPED | OUTPATIENT
Start: 2025-06-18

## 2025-06-18 RX ORDER — EPINEPHRINE 1 MG/ML
0.3 INJECTION, SOLUTION, CONCENTRATE INTRAVENOUS PRN
OUTPATIENT
Start: 2025-12-03

## 2025-06-18 RX ORDER — FAMOTIDINE 10 MG/ML
20 INJECTION, SOLUTION INTRAVENOUS
Status: CANCELLED | OUTPATIENT
Start: 2025-06-18

## 2025-06-18 RX ORDER — SODIUM CHLORIDE 9 MG/ML
INJECTION, SOLUTION INTRAVENOUS CONTINUOUS
Status: CANCELLED | OUTPATIENT
Start: 2025-06-18

## 2025-06-18 RX ORDER — DIPHENHYDRAMINE HYDROCHLORIDE 50 MG/ML
50 INJECTION, SOLUTION INTRAMUSCULAR; INTRAVENOUS
OUTPATIENT
Start: 2025-12-03

## 2025-06-18 RX ORDER — DIPHENHYDRAMINE HYDROCHLORIDE 50 MG/ML
50 INJECTION, SOLUTION INTRAMUSCULAR; INTRAVENOUS ONCE
OUTPATIENT
Start: 2025-12-03 | End: 2025-12-03

## 2025-06-18 RX ORDER — ALBUTEROL SULFATE 90 UG/1
4 INHALANT RESPIRATORY (INHALATION) PRN
Status: CANCELLED | OUTPATIENT
Start: 2025-06-18

## 2025-06-18 RX ORDER — EPINEPHRINE 1 MG/ML
0.3 INJECTION, SOLUTION, CONCENTRATE INTRAVENOUS PRN
Status: DISCONTINUED | OUTPATIENT
Start: 2025-06-18 | End: 2025-06-18 | Stop reason: HOSPADM

## 2025-06-18 RX ORDER — ACETAMINOPHEN 325 MG/1
650 TABLET ORAL
OUTPATIENT
Start: 2025-12-03

## 2025-06-18 RX ORDER — HYDROCORTISONE SODIUM SUCCINATE 100 MG/2ML
100 INJECTION INTRAMUSCULAR; INTRAVENOUS
Status: DISCONTINUED | OUTPATIENT
Start: 2025-06-18 | End: 2025-06-18 | Stop reason: HOSPADM

## 2025-06-18 RX ORDER — ALBUTEROL SULFATE 90 UG/1
4 INHALANT RESPIRATORY (INHALATION) PRN
Status: DISCONTINUED | OUTPATIENT
Start: 2025-06-18 | End: 2025-06-18 | Stop reason: HOSPADM

## 2025-06-18 RX ORDER — ONDANSETRON 2 MG/ML
8 INJECTION INTRAMUSCULAR; INTRAVENOUS
Status: CANCELLED | OUTPATIENT
Start: 2025-06-18

## 2025-06-18 RX ORDER — EPINEPHRINE 1 MG/ML
0.3 INJECTION, SOLUTION, CONCENTRATE INTRAVENOUS PRN
Status: CANCELLED | OUTPATIENT
Start: 2025-06-18

## 2025-06-18 RX ORDER — ACETAMINOPHEN 325 MG/1
650 TABLET ORAL ONCE
Status: CANCELLED | OUTPATIENT
Start: 2025-06-18 | End: 2025-06-18

## 2025-06-18 RX ORDER — SODIUM CHLORIDE 9 MG/ML
INJECTION, SOLUTION INTRAVENOUS CONTINUOUS
Status: DISCONTINUED | OUTPATIENT
Start: 2025-06-18 | End: 2025-06-18 | Stop reason: HOSPADM

## 2025-06-18 RX ORDER — PREDNISONE 5 MG/1
TABLET ORAL
Qty: 90 TABLET | Refills: 0 | Status: SHIPPED | OUTPATIENT
Start: 2025-06-18

## 2025-06-18 RX ORDER — ALBUTEROL SULFATE 90 UG/1
4 INHALANT RESPIRATORY (INHALATION) PRN
OUTPATIENT
Start: 2025-12-03

## 2025-06-18 RX ORDER — MEPERIDINE HYDROCHLORIDE 50 MG/ML
12.5 INJECTION INTRAMUSCULAR; INTRAVENOUS; SUBCUTANEOUS PRN
Status: DISCONTINUED | OUTPATIENT
Start: 2025-06-18 | End: 2025-06-18 | Stop reason: HOSPADM

## 2025-06-18 RX ORDER — SODIUM CHLORIDE 9 MG/ML
INJECTION, SOLUTION INTRAVENOUS CONTINUOUS
OUTPATIENT
Start: 2025-12-03

## 2025-06-18 NOTE — PROGRESS NOTES
[facility] RHEUMATOLOGY  [address] [place] [address], [address]  [place] [address]  Office : [phone], Fax: [phone]       Rituxan infusion today. Course # 8, treatment # 2. Patient's diagnosis is RA.   Rituxan 1000 mg infused in 250 ml NaCl. Patient monitored closely during infusion.   Premedicated with Tylenol 1000 mg PO, Antihistamine PO prior to the start of the Rituxan infusion.  Per Dr. Ly' orders, no solumedrol IV to be given today.    IV location: [facility], Inserted by Shala Duffy RN  IV insertion time: 1024    Medication start time: 1035  Medication completion time: 1355    Patient Medication Supplied? yes, [facility]    Patient discharged feeling well and instructed to call the office with any post-infusion issues.     Pre-Infusion Questionnaire  Has your insurance changed or have you received a new card since your last visit?  no  Have you had any infections since your last infusion?   no  Since your last visit, have you been hospitalized, been to the ER, or Urgent Care Center for any reason?  no  Have you recently had GI problems, open wounds, urinary problems, or chest pain?   no  Are you currently taking antibiotics?   no  Have you recently had or are you scheduled for any surgical procedures?   yes, endoscopy  Have you had a TB test in the last year?   yes, 5/22/25  Did you premedicate for today's infusion?  yes, tylenol and benadryl  Have you received any vaccinations in the last 6 months, or planning to receive in the next 3 months: COVID, Flu, Pheumonia, Shingles?  no  When was your last appointment with Dr. Boudreaux, Dr. Ly, or Dr. Castro?  6/18/25  When was your last infusion?        6/4/25  12. Are you in skilled nursing facility, Rehab, or living at a nursing home?      no    Reviewed and Confirmed by Che Moore

## 2025-06-18 NOTE — PATIENT INSTRUCTIONS
1. Labs -CBC, CMP, vitamin D  2.   Start Imuran 50 mg once daily  3.  Continue cymbalta  60 mg daily    4.  Proceed with second dose [2 of 2] of rituximab 1000 mg IV -but no steroids with infusion today.  5. Prednisone 5 once daily as needed  6. continue gabapentin but please try to take 600 mg once daily at 6 to 7 PM for about 1 to 2 weeks and then can decrease to 300 mg daily thereafter as needed.  7.  Continue vitamin D 5000 units daily  8.  Reading material on radiculopathy  9.  Return to clinic in 6 weeks    with labs    6[facility],Labs [place] prior w pt

## 2025-07-19 LAB
T4 FREE SERPL-MCNC: 1.53 NG/DL (ref 0.82–1.77)
TSH SERPL DL<=0.005 MIU/L-ACNC: 0.41 UIU/ML (ref 0.45–4.5)

## 2025-07-22 ENCOUNTER — OFFICE VISIT (OUTPATIENT)
Dept: ENDOCRINOLOGY | Age: 70
End: 2025-07-22
Payer: MEDICARE

## 2025-07-22 VITALS
HEART RATE: 78 BPM | OXYGEN SATURATION: 96 % | SYSTOLIC BLOOD PRESSURE: 164 MMHG | BODY MASS INDEX: 28.43 KG/M2 | HEIGHT: 60 IN | DIASTOLIC BLOOD PRESSURE: 86 MMHG | WEIGHT: 144.8 LBS

## 2025-07-22 DIAGNOSIS — E03.9 PRIMARY HYPOTHYROIDISM: Primary | ICD-10-CM

## 2025-07-22 PROCEDURE — 1123F ACP DISCUSS/DSCN MKR DOCD: CPT | Performed by: INTERNAL MEDICINE

## 2025-07-22 PROCEDURE — 99213 OFFICE O/P EST LOW 20 MIN: CPT | Performed by: INTERNAL MEDICINE

## 2025-07-22 PROCEDURE — 1159F MED LIST DOCD IN RCRD: CPT | Performed by: INTERNAL MEDICINE

## 2025-07-22 PROCEDURE — G2211 COMPLEX E/M VISIT ADD ON: HCPCS | Performed by: INTERNAL MEDICINE

## 2025-07-22 PROCEDURE — NBSRV NON-BILLABLE SERVICE: Performed by: INTERNAL MEDICINE

## 2025-07-22 RX ORDER — LEVOTHYROXINE SODIUM 88 UG/1
88 TABLET ORAL DAILY
Qty: 90 TABLET | Refills: 3 | Status: SHIPPED | OUTPATIENT
Start: 2025-07-22

## 2025-07-22 ASSESSMENT — ENCOUNTER SYMPTOMS
CONSTIPATION: 0
DIARRHEA: 0

## 2025-07-22 NOTE — PROGRESS NOTES
Hipolito Umaña MD, FACE  [facility]inology [facility]  [address] [place] [address], [facility] [address]  [place] [address]  Phone [phone]  Facsimile [phone]          Che Moore is a 70 y.o. female seen 7/22/2025 for follow up of hypothyroidism        ASSESSMENT AND PLAN:    1. Primary hypothyroidism  She has a longstanding history of hypothyroidism.  Based on her positive family history of thyroid disease and concurrent history of rheumatoid arthritis, I suspect she likely has Hashimoto's thyroiditis.  She is mildly biochemically thyrotoxic and I will decrease her levothyroxine.  Follow up in 3 months.    - levothyroxine (SYNTHROID) 88 MCG tablet; Take 1 tablet by mouth Daily  Dispense: 90 tablet; Refill: 3      Follow-up and Dispositions    Return in about 3 months (around 10/22/2025).         HISTORY OF PRESENT ILLNESS:    THYROID DISEASE     Presentation/Diagnosis: Hypothyroidism diagnosed in 1990 after the birth of her child.     Symptoms: See review of systems.      Treatment: Takes generic in AM correctly.       Imaging: None.     Labs:  3/13/2020: TSH 0.210.  9/16/2020: TSH 0.035, free T4 1.21.  2/25/2021: TSH 0.185, free T4 1.35.  1/7/2022: TSH 0.013, free T4 1.70.  2/21/2022: TSH 0.097, free T4 1.50.  4/20/2022: TSH 0.805, free T4 1.23, 25-OH vitamin D 45.9.  7/19/2022: TSH 0.239, free T4 1.55.  10/25/2022: TSH 0.643, free T4 1.49.  1/26/2023: TSH 1.140.  8/7/2023: TSH 1.030.  3/12/2024: TSH 2.710.  4/26/2024: TSH 6.230, free T4 1.43.  7/24/2024: TSH 0.344, free T4 1.75.  1/13/2025: TSH 1.510.  4/21/2025: TSH 0.043, free T4 1.49.  7/18/2025: TSH 0.409, free T4 1.53.      Review of Systems   Constitutional:  Positive for diaphoresis (since she started Cymbalta) and fatigue. Negative for unexpected weight change.   Cardiovascular:  Negative for palpitations.   Gastrointestinal:  Negative for constipation and diarrhea.   Endocrine: Negative for cold intolerance and heat

## 2025-08-09 LAB
25(OH)D3+25(OH)D2 SERPL-MCNC: 56.6 NG/ML (ref 30–100)
ALBUMIN SERPL-MCNC: 4.6 G/DL (ref 3.8–4.8)
ALP SERPL-CCNC: 111 IU/L (ref 44–121)
ALT SERPL-CCNC: 29 IU/L (ref 0–32)
AST SERPL-CCNC: 44 IU/L (ref 0–40)
BASOPHILS # BLD AUTO: 0.1 X10E3/UL (ref 0–0.2)
BASOPHILS NFR BLD AUTO: 1 %
BILIRUB SERPL-MCNC: 0.7 MG/DL (ref 0–1.2)
BUN SERPL-MCNC: 25 MG/DL (ref 8–27)
BUN/CREAT SERPL: 22 (ref 12–28)
CALCIUM SERPL-MCNC: 9.2 MG/DL (ref 8.7–10.3)
CHLORIDE SERPL-SCNC: 97 MMOL/L (ref 96–106)
CO2 SERPL-SCNC: 23 MMOL/L (ref 20–29)
CREAT SERPL-MCNC: 1.13 MG/DL (ref 0.57–1)
EGFRCR SERPLBLD CKD-EPI 2021: 52 ML/MIN/1.73
EOSINOPHIL # BLD AUTO: 0.2 X10E3/UL (ref 0–0.4)
EOSINOPHIL NFR BLD AUTO: 2 %
ERYTHROCYTE [DISTWIDTH] IN BLOOD BY AUTOMATED COUNT: 12.3 % (ref 11.7–15.4)
GLOBULIN SER CALC-MCNC: 2.2 G/DL (ref 1.5–4.5)
GLUCOSE SERPL-MCNC: 87 MG/DL (ref 70–99)
HCT VFR BLD AUTO: 41.9 % (ref 34–46.6)
HGB BLD-MCNC: 14 G/DL (ref 11.1–15.9)
IMM GRANULOCYTES # BLD AUTO: 0.1 X10E3/UL (ref 0–0.1)
IMM GRANULOCYTES NFR BLD AUTO: 1 %
LYMPHOCYTES # BLD AUTO: 2.3 X10E3/UL (ref 0.7–3.1)
LYMPHOCYTES NFR BLD AUTO: 25 %
MCH RBC QN AUTO: 35.2 PG (ref 26.6–33)
MCHC RBC AUTO-ENTMCNC: 33.4 G/DL (ref 31.5–35.7)
MCV RBC AUTO: 105 FL (ref 79–97)
MONOCYTES # BLD AUTO: 0.6 X10E3/UL (ref 0.1–0.9)
MONOCYTES NFR BLD AUTO: 6 %
NEUTROPHILS # BLD AUTO: 6.2 X10E3/UL (ref 1.4–7)
NEUTROPHILS NFR BLD AUTO: 65 %
PLATELET # BLD AUTO: 248 X10E3/UL (ref 150–450)
POTASSIUM SERPL-SCNC: 3.8 MMOL/L (ref 3.5–5.2)
PROT SERPL-MCNC: 6.8 G/DL (ref 6–8.5)
RBC # BLD AUTO: 3.98 X10E6/UL (ref 3.77–5.28)
SODIUM SERPL-SCNC: 140 MMOL/L (ref 134–144)
WBC # BLD AUTO: 9.5 X10E3/UL (ref 3.4–10.8)

## 2025-08-13 ENCOUNTER — TELEPHONE (OUTPATIENT)
Dept: RHEUMATOLOGY | Age: 70
End: 2025-08-13

## 2025-08-13 ENCOUNTER — OFFICE VISIT (OUTPATIENT)
Dept: RHEUMATOLOGY | Age: 70
End: 2025-08-13
Payer: MEDICARE

## 2025-08-13 VITALS
BODY MASS INDEX: 28.66 KG/M2 | DIASTOLIC BLOOD PRESSURE: 100 MMHG | HEART RATE: 80 BPM | SYSTOLIC BLOOD PRESSURE: 151 MMHG | HEIGHT: 60 IN | WEIGHT: 146 LBS

## 2025-08-13 DIAGNOSIS — Z79.899 HIGH RISK MEDICATION USE: ICD-10-CM

## 2025-08-13 DIAGNOSIS — R53.83 OTHER FATIGUE: ICD-10-CM

## 2025-08-13 DIAGNOSIS — R89.4 SEROLOGIC ABNORMALITY: ICD-10-CM

## 2025-08-13 DIAGNOSIS — M15.9 GENERALIZED OSTEOARTHRITIS: ICD-10-CM

## 2025-08-13 DIAGNOSIS — R74.8 ABNORMAL LIVER ENZYMES: ICD-10-CM

## 2025-08-13 DIAGNOSIS — Z79.60 LONG TERM CURRENT USE OF IMMUNOSUPPRESSIVE DRUG: ICD-10-CM

## 2025-08-13 DIAGNOSIS — Z13.820 SCREENING FOR OSTEOPOROSIS: ICD-10-CM

## 2025-08-13 DIAGNOSIS — M05.9 SEROPOSITIVE RHEUMATOID ARTHRITIS (HCC): Primary | ICD-10-CM

## 2025-08-13 DIAGNOSIS — G89.29 OTHER CHRONIC PAIN: ICD-10-CM

## 2025-08-13 DIAGNOSIS — E55.9 HYPOVITAMINOSIS D: ICD-10-CM

## 2025-08-13 DIAGNOSIS — M54.16 RADICULOPATHY, LUMBAR REGION: ICD-10-CM

## 2025-08-13 DIAGNOSIS — Z79.52 LONG TERM (CURRENT) USE OF SYSTEMIC STEROIDS: ICD-10-CM

## 2025-08-13 DIAGNOSIS — M05.9 SEROPOSITIVE RHEUMATOID ARTHRITIS (HCC): ICD-10-CM

## 2025-08-13 DIAGNOSIS — Z78.0 POST-MENOPAUSAL: ICD-10-CM

## 2025-08-13 DIAGNOSIS — M06.4 INFLAMMATORY POLYARTHRITIS (HCC): ICD-10-CM

## 2025-08-13 PROCEDURE — 1123F ACP DISCUSS/DSCN MKR DOCD: CPT | Performed by: INTERNAL MEDICINE

## 2025-08-13 PROCEDURE — G2211 COMPLEX E/M VISIT ADD ON: HCPCS | Performed by: INTERNAL MEDICINE

## 2025-08-13 PROCEDURE — 1125F AMNT PAIN NOTED PAIN PRSNT: CPT | Performed by: INTERNAL MEDICINE

## 2025-08-13 PROCEDURE — 1160F RVW MEDS BY RX/DR IN RCRD: CPT | Performed by: INTERNAL MEDICINE

## 2025-08-13 PROCEDURE — 1159F MED LIST DOCD IN RCRD: CPT | Performed by: INTERNAL MEDICINE

## 2025-08-13 PROCEDURE — 99215 OFFICE O/P EST HI 40 MIN: CPT | Performed by: INTERNAL MEDICINE

## 2025-08-14 RX ORDER — AZATHIOPRINE 50 MG/1
TABLET ORAL
Qty: 180 TABLET | Refills: 0 | Status: SHIPPED | OUTPATIENT
Start: 2025-08-14

## 2025-08-14 RX ORDER — PREDNISONE 5 MG/1
TABLET ORAL
Qty: 90 TABLET | Refills: 0 | Status: SHIPPED | OUTPATIENT
Start: 2025-08-14

## 2025-08-14 RX ORDER — GABAPENTIN 300 MG/1
CAPSULE ORAL
Qty: 180 CAPSULE | Refills: 0 | Status: SHIPPED | OUTPATIENT
Start: 2025-08-14 | End: 2026-07-19

## 2025-08-14 RX ORDER — DULOXETIN HYDROCHLORIDE 60 MG/1
60 CAPSULE, DELAYED RELEASE ORAL DAILY
Qty: 90 CAPSULE | Refills: 0 | Status: SHIPPED | OUTPATIENT
Start: 2025-08-14